# Patient Record
Sex: MALE | Race: BLACK OR AFRICAN AMERICAN | Employment: OTHER | URBAN - METROPOLITAN AREA
[De-identification: names, ages, dates, MRNs, and addresses within clinical notes are randomized per-mention and may not be internally consistent; named-entity substitution may affect disease eponyms.]

---

## 2017-02-14 ENCOUNTER — HOSPITAL ENCOUNTER (OUTPATIENT)
Dept: RADIATION THERAPY | Age: 82
Discharge: HOME | End: 2017-02-14
Payer: MEDICARE

## 2017-02-14 ENCOUNTER — HOSPITAL ENCOUNTER (OUTPATIENT)
Dept: NUCLEAR MEDICINE | Age: 82
Discharge: HOME | End: 2017-02-14
Attending: UROLOGY
Payer: MEDICARE

## 2017-02-14 ENCOUNTER — HOSPITAL ENCOUNTER (OUTPATIENT)
Dept: CT IMAGING | Age: 82
Discharge: HOME | End: 2017-02-14
Attending: UROLOGY
Payer: MEDICARE

## 2017-02-14 DIAGNOSIS — C61 MALIGNANT NEOPLASM OF PROSTATE (HCC): ICD-10-CM

## 2017-02-14 LAB — CREAT BLD-MCNC: 0.9 MG/DL (ref 0.6–1.3)

## 2017-02-14 PROCEDURE — 82565 ASSAY OF CREATININE: CPT

## 2017-02-14 PROCEDURE — 71260 CT THORAX DX C+: CPT

## 2017-02-14 PROCEDURE — 74177 CT ABD & PELVIS W/CONTRAST: CPT

## 2017-02-14 PROCEDURE — 74011636320 HC RX REV CODE- 636/320: Performed by: UROLOGY

## 2017-02-14 PROCEDURE — 74011000258 HC RX REV CODE- 258: Performed by: UROLOGY

## 2017-02-14 PROCEDURE — 78306 BONE IMAGING WHOLE BODY: CPT

## 2017-02-14 RX ORDER — SODIUM CHLORIDE 0.9 % (FLUSH) 0.9 %
10 SYRINGE (ML) INJECTION
Status: COMPLETED | OUTPATIENT
Start: 2017-02-14 | End: 2017-02-14

## 2017-02-14 RX ADMIN — IOPAMIDOL 100 ML: 755 INJECTION, SOLUTION INTRAVENOUS at 14:50

## 2017-02-14 RX ADMIN — SODIUM CHLORIDE 100 ML: 900 INJECTION, SOLUTION INTRAVENOUS at 14:50

## 2017-02-14 RX ADMIN — IOHEXOL 50 ML: 240 INJECTION, SOLUTION INTRATHECAL; INTRAVASCULAR; INTRAVENOUS; ORAL at 11:00

## 2017-02-14 RX ADMIN — Medication 10 ML: at 14:50

## 2017-08-22 ENCOUNTER — HOSPITAL ENCOUNTER (OUTPATIENT)
Dept: CT IMAGING | Age: 82
Discharge: HOME | End: 2017-08-22
Attending: SPECIALIST
Payer: MEDICARE

## 2017-08-22 DIAGNOSIS — C07 MALIGNANT NEOPLASM OF PAROTID GLAND (HCC): ICD-10-CM

## 2017-08-22 DIAGNOSIS — H90.3 SENSORY HEARING LOSS, BILATERAL: ICD-10-CM

## 2017-08-22 LAB — CREAT BLD-MCNC: 0.9 MG/DL (ref 0.6–1.3)

## 2017-08-22 PROCEDURE — 74011000258 HC RX REV CODE- 258: Performed by: SPECIALIST

## 2017-08-22 PROCEDURE — 70486 CT MAXILLOFACIAL W/O DYE: CPT

## 2017-08-22 PROCEDURE — 82565 ASSAY OF CREATININE: CPT

## 2017-08-22 PROCEDURE — 74011636320 HC RX REV CODE- 636/320: Performed by: SPECIALIST

## 2017-08-22 PROCEDURE — 70491 CT SOFT TISSUE NECK W/DYE: CPT

## 2017-08-22 RX ORDER — SODIUM CHLORIDE 0.9 % (FLUSH) 0.9 %
10 SYRINGE (ML) INJECTION
Status: COMPLETED | OUTPATIENT
Start: 2017-08-22 | End: 2017-08-22

## 2017-08-22 RX ADMIN — SODIUM CHLORIDE 100 ML: 900 INJECTION, SOLUTION INTRAVENOUS at 14:24

## 2017-08-22 RX ADMIN — Medication 10 ML: at 14:24

## 2017-08-22 RX ADMIN — IOPAMIDOL 100 ML: 612 INJECTION, SOLUTION INTRAVENOUS at 14:24

## 2017-09-01 ENCOUNTER — HOSPITAL ENCOUNTER (OUTPATIENT)
Dept: RADIATION THERAPY | Age: 82
Discharge: HOME | End: 2017-09-01

## 2017-09-12 ENCOUNTER — HOSPITAL ENCOUNTER (EMERGENCY)
Age: 82
Discharge: STILL ADMITTED OR ADMINISTRATIVE | End: 2017-09-12
Attending: EMERGENCY MEDICINE
Payer: MEDICARE

## 2017-09-12 ENCOUNTER — HOSPITAL ENCOUNTER (OUTPATIENT)
Dept: PET IMAGING | Age: 82
Discharge: HOME | End: 2017-09-12
Attending: RADIOLOGY
Payer: MEDICARE

## 2017-09-12 VITALS — WEIGHT: 150 LBS | HEIGHT: 65 IN | BODY MASS INDEX: 24.99 KG/M2

## 2017-09-12 DIAGNOSIS — C07 MALIGNANT NEOPLASM OF PAROTID GLAND (HCC): ICD-10-CM

## 2017-09-12 DIAGNOSIS — C08.9 MALIGNANT NEOPLASM OF SALIVARY GLAND (HCC): ICD-10-CM

## 2017-09-12 PROCEDURE — 75810000275 HC EMERGENCY DEPT VISIT NO LEVEL OF CARE

## 2017-09-12 PROCEDURE — A9552 F18 FDG: HCPCS

## 2017-09-12 RX ORDER — SODIUM CHLORIDE 0.9 % (FLUSH) 0.9 %
10 SYRINGE (ML) INJECTION
Status: COMPLETED | OUTPATIENT
Start: 2017-09-12 | End: 2017-09-12

## 2017-09-12 RX ADMIN — Medication 10 ML: at 12:00

## 2018-02-06 ENCOUNTER — HOSPITAL ENCOUNTER (INPATIENT)
Age: 83
LOS: 3 days | Discharge: HOME | DRG: 312 | End: 2018-02-09
Attending: EMERGENCY MEDICINE | Admitting: FAMILY MEDICINE
Payer: MEDICARE

## 2018-02-06 ENCOUNTER — APPOINTMENT (OUTPATIENT)
Dept: GENERAL RADIOLOGY | Age: 83
DRG: 312 | End: 2018-02-06
Attending: EMERGENCY MEDICINE
Payer: MEDICARE

## 2018-02-06 ENCOUNTER — APPOINTMENT (OUTPATIENT)
Dept: CT IMAGING | Age: 83
DRG: 312 | End: 2018-02-06
Attending: FAMILY MEDICINE
Payer: MEDICARE

## 2018-02-06 ENCOUNTER — APPOINTMENT (OUTPATIENT)
Dept: CT IMAGING | Age: 83
DRG: 312 | End: 2018-02-06
Attending: EMERGENCY MEDICINE
Payer: MEDICARE

## 2018-02-06 DIAGNOSIS — R63.6 LOW WEIGHT: ICD-10-CM

## 2018-02-06 DIAGNOSIS — R55 SYNCOPE AND COLLAPSE: Primary | ICD-10-CM

## 2018-02-06 LAB
ALBUMIN SERPL-MCNC: 3.1 G/DL (ref 3.5–5)
ALBUMIN/GLOB SERPL: 0.7 {RATIO} (ref 1.1–2.2)
ALP SERPL-CCNC: 435 U/L (ref 45–117)
ALT SERPL-CCNC: 14 U/L (ref 12–78)
ANION GAP SERPL CALC-SCNC: 11 MMOL/L (ref 5–15)
APPEARANCE UR: CLEAR
AST SERPL-CCNC: 38 U/L (ref 15–37)
ATRIAL RATE: 62 BPM
BACTERIA URNS QL MICRO: NEGATIVE /HPF
BASOPHILS # BLD: 0.1 K/UL (ref 0–0.1)
BASOPHILS NFR BLD: 2 % (ref 0–1)
BILIRUB SERPL-MCNC: 0.4 MG/DL (ref 0.2–1)
BILIRUB UR QL: NEGATIVE
BUN SERPL-MCNC: 24 MG/DL (ref 6–20)
BUN/CREAT SERPL: 18 (ref 12–20)
CALCIUM SERPL-MCNC: 9.4 MG/DL (ref 8.5–10.1)
CALCULATED P AXIS, ECG09: 34 DEGREES
CALCULATED R AXIS, ECG10: 37 DEGREES
CALCULATED T AXIS, ECG11: 39 DEGREES
CHLORIDE SERPL-SCNC: 103 MMOL/L (ref 97–108)
CK SERPL-CCNC: 89 U/L (ref 39–308)
CO2 SERPL-SCNC: 24 MMOL/L (ref 21–32)
COLOR UR: ABNORMAL
CREAT SERPL-MCNC: 1.3 MG/DL (ref 0.7–1.3)
DIAGNOSIS, 93000: NORMAL
DIFFERENTIAL METHOD BLD: ABNORMAL
EOSINOPHIL # BLD: 0 K/UL (ref 0–0.4)
EOSINOPHIL NFR BLD: 1 % (ref 0–7)
EPITH CASTS URNS QL MICRO: ABNORMAL /LPF
ERYTHROCYTE [DISTWIDTH] IN BLOOD BY AUTOMATED COUNT: 17.8 % (ref 11.5–14.5)
FLUAV AG NPH QL IA: NEGATIVE
FLUBV AG NOSE QL IA: NEGATIVE
FOLATE SERPL-MCNC: 26.4 NG/ML (ref 5–21)
GLOBULIN SER CALC-MCNC: 4.3 G/DL (ref 2–4)
GLUCOSE SERPL-MCNC: 121 MG/DL (ref 65–100)
GLUCOSE UR STRIP.AUTO-MCNC: NEGATIVE MG/DL
HCT VFR BLD AUTO: 29 % (ref 36.6–50.3)
HGB BLD-MCNC: 9.4 G/DL (ref 12.1–17)
HGB UR QL STRIP: NEGATIVE
HYALINE CASTS URNS QL MICRO: ABNORMAL /LPF (ref 0–5)
IMM GRANULOCYTES # BLD: 0 K/UL
IMM GRANULOCYTES NFR BLD AUTO: 0 %
KETONES UR QL STRIP.AUTO: NEGATIVE MG/DL
LEUKOCYTE ESTERASE UR QL STRIP.AUTO: NEGATIVE
LYMPHOCYTES # BLD: 1 K/UL (ref 0.8–3.5)
LYMPHOCYTES NFR BLD: 36 % (ref 12–49)
MCH RBC QN AUTO: 29.7 PG (ref 26–34)
MCHC RBC AUTO-ENTMCNC: 32.4 G/DL (ref 30–36.5)
MCV RBC AUTO: 91.8 FL (ref 80–99)
METAMYELOCYTES NFR BLD MANUAL: 1 %
MONOCYTES # BLD: 0.3 K/UL (ref 0–1)
MONOCYTES NFR BLD: 9 % (ref 5–13)
MYELOCYTES NFR BLD MANUAL: 2 %
NEUTS BAND NFR BLD MANUAL: 1 % (ref 0–6)
NEUTS SEG # BLD: 1.4 K/UL (ref 1.8–8)
NEUTS SEG NFR BLD: 48 % (ref 32–75)
NITRITE UR QL STRIP.AUTO: NEGATIVE
NRBC # BLD: 0.12 K/UL (ref 0–0.01)
NRBC BLD-RTO: 4.3 PER 100 WBC
P-R INTERVAL, ECG05: 114 MS
PH UR STRIP: 5 [PH] (ref 5–8)
PLATELET # BLD AUTO: 100 K/UL (ref 150–400)
PMV BLD AUTO: 9.8 FL (ref 8.9–12.9)
POTASSIUM SERPL-SCNC: 4.2 MMOL/L (ref 3.5–5.1)
PROT SERPL-MCNC: 7.4 G/DL (ref 6.4–8.2)
PROT UR STRIP-MCNC: 30 MG/DL
Q-T INTERVAL, ECG07: 448 MS
QRS DURATION, ECG06: 80 MS
QTC CALCULATION (BEZET), ECG08: 454 MS
RBC # BLD AUTO: 3.16 M/UL (ref 4.1–5.7)
RBC #/AREA URNS HPF: ABNORMAL /HPF (ref 0–5)
RBC MORPH BLD: ABNORMAL
SODIUM SERPL-SCNC: 138 MMOL/L (ref 136–145)
SP GR UR REFRACTOMETRY: 1.02 (ref 1–1.03)
TROPONIN I SERPL-MCNC: <0.04 NG/ML
TROPONIN I SERPL-MCNC: <0.04 NG/ML
TSH SERPL DL<=0.05 MIU/L-ACNC: 1.23 UIU/ML (ref 0.36–3.74)
UR CULT HOLD, URHOLD: NORMAL
UROBILINOGEN UR QL STRIP.AUTO: 1 EU/DL (ref 0.2–1)
VENTRICULAR RATE, ECG03: 62 BPM
VIT B12 SERPL-MCNC: 643 PG/ML (ref 211–911)
WBC # BLD AUTO: 2.8 K/UL (ref 4.1–11.1)
WBC URNS QL MICRO: ABNORMAL /HPF (ref 0–4)

## 2018-02-06 PROCEDURE — 99285 EMERGENCY DEPT VISIT HI MDM: CPT

## 2018-02-06 PROCEDURE — 93306 TTE W/DOPPLER COMPLETE: CPT | Performed by: FAMILY MEDICINE

## 2018-02-06 PROCEDURE — 36415 COLL VENOUS BLD VENIPUNCTURE: CPT | Performed by: EMERGENCY MEDICINE

## 2018-02-06 PROCEDURE — 74011000258 HC RX REV CODE- 258: Performed by: EMERGENCY MEDICINE

## 2018-02-06 PROCEDURE — 74011250636 HC RX REV CODE- 250/636: Performed by: FAMILY MEDICINE

## 2018-02-06 PROCEDURE — 81001 URINALYSIS AUTO W/SCOPE: CPT | Performed by: EMERGENCY MEDICINE

## 2018-02-06 PROCEDURE — 80053 COMPREHEN METABOLIC PANEL: CPT | Performed by: EMERGENCY MEDICINE

## 2018-02-06 PROCEDURE — 93005 ELECTROCARDIOGRAM TRACING: CPT

## 2018-02-06 PROCEDURE — 82607 VITAMIN B-12: CPT | Performed by: EMERGENCY MEDICINE

## 2018-02-06 PROCEDURE — 70496 CT ANGIOGRAPHY HEAD: CPT

## 2018-02-06 PROCEDURE — 84443 ASSAY THYROID STIM HORMONE: CPT | Performed by: EMERGENCY MEDICINE

## 2018-02-06 PROCEDURE — 74011250637 HC RX REV CODE- 250/637: Performed by: FAMILY MEDICINE

## 2018-02-06 PROCEDURE — 82746 ASSAY OF FOLIC ACID SERUM: CPT | Performed by: EMERGENCY MEDICINE

## 2018-02-06 PROCEDURE — 70450 CT HEAD/BRAIN W/O DYE: CPT

## 2018-02-06 PROCEDURE — 74011636320 HC RX REV CODE- 636/320: Performed by: EMERGENCY MEDICINE

## 2018-02-06 PROCEDURE — 71046 X-RAY EXAM CHEST 2 VIEWS: CPT

## 2018-02-06 PROCEDURE — 65660000000 HC RM CCU STEPDOWN

## 2018-02-06 PROCEDURE — 84484 ASSAY OF TROPONIN QUANT: CPT | Performed by: EMERGENCY MEDICINE

## 2018-02-06 PROCEDURE — 87804 INFLUENZA ASSAY W/OPTIC: CPT | Performed by: FAMILY MEDICINE

## 2018-02-06 PROCEDURE — 82550 ASSAY OF CK (CPK): CPT | Performed by: EMERGENCY MEDICINE

## 2018-02-06 PROCEDURE — 85025 COMPLETE CBC W/AUTO DIFF WBC: CPT | Performed by: EMERGENCY MEDICINE

## 2018-02-06 RX ORDER — SODIUM CHLORIDE 0.9 % (FLUSH) 0.9 %
5-10 SYRINGE (ML) INJECTION AS NEEDED
Status: DISCONTINUED | OUTPATIENT
Start: 2018-02-06 | End: 2018-02-09 | Stop reason: HOSPADM

## 2018-02-06 RX ORDER — ATENOLOL 25 MG/1
50 TABLET ORAL
Status: DISCONTINUED | OUTPATIENT
Start: 2018-02-07 | End: 2018-02-09 | Stop reason: HOSPADM

## 2018-02-06 RX ORDER — LOVASTATIN 20 MG/1
40 TABLET ORAL
Status: DISCONTINUED | OUTPATIENT
Start: 2018-02-06 | End: 2018-02-09 | Stop reason: HOSPADM

## 2018-02-06 RX ORDER — LOVASTATIN 20 MG/1
40 TABLET ORAL
Status: DISCONTINUED | OUTPATIENT
Start: 2018-02-06 | End: 2018-02-06

## 2018-02-06 RX ORDER — SODIUM CHLORIDE 9 MG/ML
75 INJECTION, SOLUTION INTRAVENOUS CONTINUOUS
Status: DISPENSED | OUTPATIENT
Start: 2018-02-06 | End: 2018-02-07

## 2018-02-06 RX ORDER — ASPIRIN 81 MG/1
81 TABLET ORAL DAILY
Status: DISCONTINUED | OUTPATIENT
Start: 2018-02-07 | End: 2018-02-07 | Stop reason: SDUPTHER

## 2018-02-06 RX ORDER — GUAIFENESIN 100 MG/5ML
81 LIQUID (ML) ORAL DAILY
Status: DISCONTINUED | OUTPATIENT
Start: 2018-02-07 | End: 2018-02-09 | Stop reason: HOSPADM

## 2018-02-06 RX ORDER — FAMOTIDINE 20 MG/1
20 TABLET, FILM COATED ORAL EVERY 12 HOURS
Status: DISCONTINUED | OUTPATIENT
Start: 2018-02-06 | End: 2018-02-07

## 2018-02-06 RX ORDER — ACETAMINOPHEN 325 MG/1
650 TABLET ORAL
Status: DISCONTINUED | OUTPATIENT
Start: 2018-02-06 | End: 2018-02-09 | Stop reason: HOSPADM

## 2018-02-06 RX ORDER — SODIUM CHLORIDE 0.9 % (FLUSH) 0.9 %
10 SYRINGE (ML) INJECTION
Status: COMPLETED | OUTPATIENT
Start: 2018-02-06 | End: 2018-02-06

## 2018-02-06 RX ORDER — SODIUM CHLORIDE 0.9 % (FLUSH) 0.9 %
5-10 SYRINGE (ML) INJECTION EVERY 8 HOURS
Status: DISCONTINUED | OUTPATIENT
Start: 2018-02-06 | End: 2018-02-09 | Stop reason: HOSPADM

## 2018-02-06 RX ORDER — ACETAMINOPHEN 650 MG/1
650 SUPPOSITORY RECTAL
Status: DISCONTINUED | OUTPATIENT
Start: 2018-02-06 | End: 2018-02-09 | Stop reason: HOSPADM

## 2018-02-06 RX ADMIN — SODIUM CHLORIDE 75 ML/HR: 900 INJECTION, SOLUTION INTRAVENOUS at 19:19

## 2018-02-06 RX ADMIN — Medication 10 ML: at 18:00

## 2018-02-06 RX ADMIN — FAMOTIDINE 20 MG: 20 TABLET, FILM COATED ORAL at 21:41

## 2018-02-06 RX ADMIN — SODIUM CHLORIDE 100 ML: 900 INJECTION, SOLUTION INTRAVENOUS at 16:12

## 2018-02-06 RX ADMIN — Medication 10 ML: at 16:12

## 2018-02-06 RX ADMIN — IOPAMIDOL 100 ML: 755 INJECTION, SOLUTION INTRAVENOUS at 16:12

## 2018-02-06 RX ADMIN — LOVASTATIN 40 MG: 20 TABLET ORAL at 21:41

## 2018-02-06 RX ADMIN — Medication 10 ML: at 21:44

## 2018-02-06 NOTE — PROGRESS NOTES
Admission Medication Reconciliation:    Information obtained from: patient, patient's children    Significant PMH/Disease States:   Past Medical History:   Diagnosis Date    Arthritis     Cancer ([facility] [address] 2004    malignant neoplasm of salivary glands    GERD (gastroesophageal reflux disease)     Hearing loss     bilateral    Heart failure ([facility] [address]     HAS PACER    Hypercholesteremia     Hypertension     medicated    Other ill-defined conditions(799.89)     facial nerve disorder    Other ill-defined conditions(799.89)     enlarged prostate    Other ill-defined conditions(799.89)     tumor on parotid gland    Stroke [facility]) 2005       Chief Complaint for this Admission:    Chief Complaint   Patient presents with    Syncope        Allergies:  Review of patient's allergies indicates no known allergies. Prior to Admission Medications:   Prior to Admission Medications   Prescriptions Last Dose Informant Patient Reported? Taking? HYDROcodone-acetaminophen (NORCO) 5-325 mg per tablet   No Yes   Sig: Take 1 Tab by mouth every four (4) hours as needed. Max Daily Amount: 6 Tabs. aspirin delayed-release 81 mg tablet   No Yes   Sig: Take 1 Tab by mouth daily. atenolol (TENORMIN) 50 mg tablet   Yes Yes   Sig: Take 50 mg by mouth every morning. Facility-Administered Medications: None         Comments/Recommendations: All medications/allergies have been reviewed and updated. Obtained medication information from the patient and his children. The patient stated that he stopped taking Asya (dutasteride/tamsulosin) and lovastatin. He stated \"I only take a blood pressure medication and a baby aspirin. \" He has Norco PRN, but per his daughter he has only had to take it about 3 times total.     Changes made to Prior to Admission (PTA) Medication List:     Medications Added:  - None     Medications Changed:  - None     Medications Removed:  - York Late  - lovastatin       Thank you for allowing me to participate in the care of this patient. Please contact the medication reconciliation pharmacist () with any questions. Mark Hanks, PharmD Candidate Class of 2018      Per Dr. Maria Fernanda Anna, start lovastatin. FRDEA rodriguez.

## 2018-02-06 NOTE — ED PROVIDER NOTES
HPI Comments: 80 y.o. male with past medical history significant for HTN, hypercholesteremia, parotid gland tumor, stroke, arthritis, GERD, and pacemaker placement who presents from home via EMS with chief complaint of syncope. Pt states he sustained a syncopal episode during breakfast this morning. Pt is unsure how long he was unconsciousness and he is unable to recall details about the event. Pt states this is not new and he has had 6-8 syncopal episodes in the past.  Pt states he has been evaluated in the ED but a cause was never found. Pt notes he had some nausea this morning but started vomiting after the episode. Pt reportedly had a stroke 12 years ago and a history of parotid gland CA. 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 Pt states for the last two weeks he has had increased L-facial weakness with some recent diarrhea and chills. Pt denies having urinary incontinence, CP, or SOB. There are no other acute medical concerns at this time. Old Chart Review: Pt has a history of parotid gland cancer which was diagnosed in 2004. He underwent surgery for the parotid gland cancer in 05/2011 and he had a recurrence of the parotid gland CA two years ago. Pt underwent resection of the tumor on 11/01/2016 which was done by Dr. Lubna Cooley. PCP: Judith Dietz MD    Neurosurgeon: Dr. Lubna Cooley    ENT: Dr. Kiko Fields    Note written by Milena Chinchilla. Rainelle Gaucher, as dictated by Daniel Coleman MD 10:33 AM    The history is provided by the patient and a relative.         Past Medical History:   Diagnosis Date    Arthritis     Cancer [facility]) 2004    malignant neoplasm of salivary glands    GERD (gastroesophageal reflux disease)     Hearing loss     bilateral    Heart failure (H[facility])     HAS PACER    Hypercholesteremia     Hypertension     medicated    Other ill-defined conditions(799.89)     facial nerve disorder    Other ill-defined conditions(799.89) enlarged prostate    Other ill-defined conditions(799.89)     tumor on parotid gland    Stroke [facility]) 2005       Past Surgical History:   Procedure Laterality Date    HX APPENDECTOMY      HX HEENT  5/2011    parotid gland surgery    HX ORTHOPAEDIC  1980's    Rotator cuff repair-RIGHT    HX OTHER SURGICAL      keloid removed left shoulder    HX PACEMAKER  2016    HX UROLOGICAL      prostate surgery         Family History:   Problem Relation Age of Onset    Cancer Mother      UNKNOWN LOCATION    Anesth Problems Neg Hx        Social History     Social History    Marital status:      Spouse name: N/A    Number of children: N/A    Years of education: N/A     Occupational History    Not on file. Social History Main Topics    Smoking status: Former Smoker     Packs/day: 1.00     Years: 20.00     Quit date: 5/18/1980    Smokeless tobacco: Never Used    Alcohol use No    Drug use: No    Sexual activity: Not on file     Other Topics Concern    Not on file     Social History Narrative         ALLERGIES: Review of patient's allergies indicates no known allergies. Review of Systems   Constitutional: Positive for chills. Negative for fever. HENT: Negative for rhinorrhea and sore throat. Respiratory: Negative for cough and shortness of breath. Cardiovascular: Negative for chest pain. Gastrointestinal: Positive for diarrhea, nausea and vomiting. Negative for abdominal pain. Genitourinary: Negative for dysuria and hematuria. Negative for urinary incontinence   Musculoskeletal: Negative for arthralgias and myalgias. Skin: Negative for pallor and rash. Neurological: Positive for syncope and facial asymmetry (not new). Negative for dizziness, weakness and light-headedness. All other systems reviewed and are negative. Vitals:    02/06/18 1029   BP: 122/62   Pulse: 62   Resp: 16   Temp: 97.5 °F (36.4 °C)   SpO2: 100%            Physical Exam   Vital signs reviewed.   Nursing notes reviewed. Const:  No acute distress, well developed, well nourished  Head:  Atraumatic, normocephalic  Eyes:  PERRL, conjunctiva normal, no scleral icterus  Neck:  Supple, trachea midline  Cardiovascular:  RRR, no murmurs, no gallops, no rubs  Resp:  No resp distress, no increased work of breathing, no wheezes, no rhonchi, no rales,  Abd:  Soft, non-tender, non-distended, no rebound, no guarding, no CVA tenderness  :  Deferred  MSK:  No pedal edema, normal ROM  Neuro:  Alert and oriented x3; L-sided facial droop  Skin:  Warm, dry, intact  Psych: normal mood and affect, behavior is normal, judgement and thought content is normal    Note written by Jacqui Friday. Gary Ruiz, as dictated by Imer Spear MD 10:33 AM    MDM  Number of Diagnoses or Management Options  Syncope and collapse:      Amount and/or Complexity of Data Reviewed  Clinical lab tests: ordered and reviewed  Tests in the radiology section of CPT®: ordered and reviewed  Review and summarize past medical records: yes    Patient Progress  Patient progress: stable    Pt. Presents to the ER with complaints of syncope. Pt. Has a history of the same. Concern for syncope v. Seizure. Pt. Is awake and alert and at his baseline in the ER. Pt. To be evaluated for admission by the hospitalist and to be seen by ENT. ED Course       Procedures    CONSULT NOTE:  1:55 PM Imer Spear MD spoke with Dr. Farnaz Wood, Consult for Hospitalist.  Discussed available diagnostic tests and clinical findings. He is in agreement with care plans as outlined. He would like for Dr. Alexsandra Matthew to be consulted. He will see and admit. CONSULT NOTE:  2:36 PM Imer Spear MD spoke with Dr. Mariana Madison, Consult for ENT. Discussed available diagnostic tests and clinical findings. He will be involved with his care. He cannot get anymore radiation or more surgery.

## 2018-02-06 NOTE — ROUTINE PROCESS
TRANSFER - OUT REPORT:    Verbal report given to Royal Langford RN (name) on Phoebe Peterson  being transferred to [facility](unit) for routine progression of care       Report consisted of patients Situation, Background, Assessment and   Recommendations(SBAR). Information from the following report(s) SBAR and ED Summary was reviewed with the receiving nurse. Lines:   Peripheral IV 02/06/18 Left Antecubital (Active)   Site Assessment Clean, dry, & intact 2/6/2018  1:52 PM   Phlebitis Assessment 0 2/6/2018  1:52 PM   Infiltration Assessment 0 2/6/2018  1:52 PM   Dressing Status Clean, dry, & intact 2/6/2018  1:52 PM   Dressing Type Transparent 2/6/2018  1:52 PM   Hub Color/Line Status Pink;Capped;Flushed;Patent 2/6/2018  1:52 PM        Opportunity for questions and clarification was provided.

## 2018-02-06 NOTE — H&P
[address] [place] [address]   ACUTE CARE HISTORY AND PHYSICAL    Keturah HOLGUIN  MR#: [id number]  : 1935  ACCOUNT #: [de-identified]   DATE OF SERVICE: 2018    CHIEF COMPLAINT:  Syncope. HISTORY OF PRESENT ILLNESS:  The patient is an 66-year-old gentleman with past medical history of parotid tumor, status post radiation as well as three surgeries; hypertension, hypercholesterolemia, stroke, arthritis, GERD and pacemaker placement, who presents to the hospital with the above-mentioned symptoms. The patient reports that his symptoms happened this morning. He was eating breakfast when he had a sudden onset of syncope and passed out. The patient does not remember how long he was passed out, does not recall much details but does report that he did not have any prodromal symptoms; did not have any chest pain, shortness of breath, lightheadedness, dizziness, or any changes in his neurological status. The patient reports that he has a left-sided parotid tumor that was diagnosed \"many years back. \"  The patient reports that he received multiple doses of radiation and also underwent surgery three times, the last being around , but at that point of time, the full tumor could not be removed. 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 The patient reports that he has noticed that his left side is weaker than usual.  He has left lower extremity and upper extremity weakness that has been going on for many weeks. The patient also reports that he had some loose stools with one episode yesterday and also had some chills. The patient denies any other complaints or problems.   Denies any headache, blurry vision, trouble swallowing or trouble with speech; any chest pain, shortness of breath, cough, fever, chills, abdominal pain, constipation, diarrhea, urinary symptoms, focal or generalized neurological weakness, recent travel, sick contacts, falls, injuries or any other concerns or problems besides all those mentioned above in terms of neurological changes. The patient denies any other complaints or problems. PAST MEDICAL HISTORY:  See above. HOME MEDICATIONS:  Currently the patient is on aspirin 81 mg daily, Norco 5/325 mg q.4 hours, atenolol 50 mg daily. SOCIAL HISTORY:  Former smoker, used to smoke 1 pack per day for 20 years. No alcohol, no IV drug abuse. ALLERGIES:  NO KNOWN DRUG ALLERGIES. FAMILY HISTORY:  Mother has history of unknown cancer. REVIEW OF SYSTEMS:  All systems were reviewed and are found to be essentially negative except for the symptoms mentioned above. PHYSICAL EXAMINATION:  VITAL SIGNS:  Temperature 97.5, pulse 61, respiration rate 14, blood pressure 117/52, pulse ox 94% on room air. GENERAL:  Alert x3, awake, no acute distress, resting in bed, pleasant male, appears to be stated age. HEENT:  Pupils equal and reactive to light. Left facial droop. Moist mucous membranes. Nonicteric sclerae. NECK:  Supple. CHEST:  Clear to auscultation bilaterally. CORONARY:  S1, S2 heard. ABDOMEN:  Soft, nontender, nondistended. Bowel sounds are physiologic. EXTREMITIES:  No clubbing, no cyanosis, no edema. NEUROPSYCHIATRIC:  Pleasant mood and affect. Cranial nerves II-XII were grossly intact except for left facial droop. Left upper extremity shows 4/5 strength, left lower extremity 4/5 strength with minimal drift. Right upper extremity and right lower extremity 5/5 . SKIN:  Warm. LABORATORY DATA:  White count 2.0, hemoglobin 9.4, hematocrit 29, platelets . Urine shows no signs of infection. Sodium , potassium 4.2, chloride 103, bicarbonate 24, anion gap 11, glucose 121, BUN 24, creatinine , ALT 14, AST 30, alkaline phosphatase 435. CK 89, troponin less than 0.04.   CT of the head shows interval increase in the size of obstructive left parietal bone lesion and left skull base soft tissue lesion as described, which is 5.9 x 2.6 x 5.7 cm. X-ray of the chest shows no acute cardiopulmonary disease. EKG shows no acute ST elevation. ASSESSMENT AND PLAN:   1. Syncope with possible left-sided weakness. The patient has extension of what appears to be a parotid gland tumor into the parietal lobe which may be causing the syncope but does not explain left sided weakness  We will admit the patient on a telemetry bed. Neurosurgery as well as ENT consult has been requested. We will get a CTA of the head to better decipher patient's pathology. We will put patient on fall precautions. Continue aspirin, neurovascular checks, echocardiogram and close monitoring  hospital course  await neurosurgery and ENT input and continue to closely monitor. 2.  Syncope, mostly likely from #1. We will rule out other causes. We will get an echocardiogram.  We will provide  telemetry monitoring; TSH, B12 and folate levels and further intervention as per hospital course. Continue to monitor  as needed. 3.  Hypertension. Continue home medication. 4.  Hypercholesterolemia. Continue home medication. 5.  Mild elevation in liver enzymes. The etiology could be secondary to #1. We will repeat labs in the morning and continue to monitor. The patient denies any abdominal pain or any other symptoms associated with this . 6.   deep venous thrombosis prophylaxis. The patient will be on SCDs.       Marlin Libman, MD MM / TRENT  D: 02/06/2018 16:00     T: 02/06/2018 16:32  JOB #: [id number]

## 2018-02-06 NOTE — ED TRIAGE NOTES
Pt arrives with syncopal episode lasting \"longer than normal today and n/v/d x several days. Pt reports he has syncopal episodes frequently & this is normal for him. Hx of brain tumor. Pt also reports \"left side has been weaker for about a week\".

## 2018-02-06 NOTE — IP AVS SNAPSHOT
[address] 
[phone] Patient: Mark Rust MRN: [id number] MYM:0/00/3669 A check macy indicates which time of day the medication should be taken. My Medications START taking these medications Instructions Each Dose to Equal  
 Morning Noon Evening Bedtime  
 dronabinol 5 mg capsule Commonly known as:  Ton Wagner Your last dose was: Your next dose is: Take 1 Cap by mouth two (2) times a day. Max Daily Amount: 10 mg.  
 5 mg  
    
   
   
   
  
 levETIRAcetam 500 mg tablet Commonly known as:  KEPPRA Your last dose was: Your next dose is: Take 1 Tab by mouth two (2) times a day. 500 mg CONTINUE taking these medications Instructions Each Dose to Equal  
 Morning Noon Evening Bedtime  
 aspirin delayed-release 81 mg tablet Your last dose was: Your next dose is: Take 1 Tab by mouth daily. 81 mg  
    
   
   
   
  
 atenolol 50 mg tablet Commonly known as:  TENORMIN Your last dose was: Your next dose is: Take 50 mg by mouth every morning. 50 mg HYDROcodone-acetaminophen 5-325 mg per tablet Commonly known as:  Guadlupe Elms Your last dose was: Your next dose is: Take 1 Tab by mouth every four (4) hours as needed. Max Daily Amount: 6 Tabs. 1 Tab Where to Get Your Medications Information on where to get these meds will be given to you by the nurse or doctor. ! Ask your nurse or doctor about these medications  
  dronabinol 5 mg capsule  
 levETIRAcetam 500 mg tablet

## 2018-02-06 NOTE — IP AVS SNAPSHOT
[address] 
[phone] Patient: Garret Bruce MRN: [id number] VNS:9/80/8676 About your hospitalization You were admitted on:  February 6, 2018 You last received care in the:  [facility] 2N MED SURG You were discharged on:  February 9, 2018 Why you were hospitalized Your primary diagnosis was:  Syncope Your diagnoses also included:  Low Weight Follow-up Information Follow up With Details Comments Contact Info Noemy Villarreal MD  note empiric keppra for now ( neg EEG) [address] [facility] [address] 
[phone] Discharge Orders None A check macy indicates which time of day the medication should be taken. My Medications START taking these medications Instructions Each Dose to Equal  
 Morning Noon Evening Bedtime  
 dronabinol 5 mg capsule Commonly known as:  Marielle Garcia Your last dose was: Your next dose is: Take 1 Cap by mouth two (2) times a day. Max Daily Amount: 10 mg.  
 5 mg  
    
   
   
   
  
 levETIRAcetam 500 mg tablet Commonly known as:  KEPPRA Your last dose was: Your next dose is: Take 1 Tab by mouth two (2) times a day. 500 mg CONTINUE taking these medications Instructions Each Dose to Equal  
 Morning Noon Evening Bedtime  
 aspirin delayed-release 81 mg tablet Your last dose was: Your next dose is: Take 1 Tab by mouth daily. 81 mg  
    
   
   
   
  
 atenolol 50 mg tablet Commonly known as:  TENORMIN Your last dose was: Your next dose is: Take 50 mg by mouth every morning. 50 mg HYDROcodone-acetaminophen 5-325 mg per tablet Commonly known as:  Judd Fraction Your last dose was: Your next dose is: Take 1 Tab by mouth every four (4) hours as needed. Max Daily Amount: 6 Tabs. 1 Tab Where to Get Your Medications Information on where to get these meds will be given to you by the nurse or doctor. ! Ask your nurse or doctor about these medications  
  dronabinol 5 mg capsule  
 levETIRAcetam 500 mg tablet Discharge Instructions Discharge Instructions PATIENT ID: Chester Pallas MRN: [id number] YOB: 1935 DATE OF ADMISSION: 2/6/2018 10:21 AM   
DATE OF DISCHARGE: 2/9/2018 PRIMARY CARE PROVIDER: Chacho Murphy MD  
 
ATTENDING PHYSICIAN: Nena Mata MD 
DISCHARGING PROVIDER: Oz Petty MD   
To contact this individual call [phone] and ask the  to page. If unavailable ask to be transferred the Adult Hospitalist Department. DISCHARGE DIAGNOSES syncope CONSULTATIONS: IP CONSULT TO HOSPITALIST 
IP CONSULT TO OTOLARYNGOLOGY 
IP CONSULT TO NEUROLOGY 
IP CONSULT TO NEUROSURGERY 
IP CONSULT TO CARDIOLOGY PROCEDURES/SURGERIES: * No surgery found * PENDING TEST RESULTS:  
At the time of discharge the following test results are still pending: EEG 
 
FOLLOW UP APPOINTMENTS:  
Follow-up Information Follow up With Details Comments Contact Info Chacho Murphy MD  note empiric keppra for now ( neg EEG) [address] [place] [address] 
[phone] ADDITIONAL CARE RECOMMENDATIONS: na 
 
DIET: Resume previous diet ACTIVITY: Activity as tolerated WOUND CARE: na 
 
EQUIPMENT needed: na 
 
 
  
 SNF/Inpatient Rehab/LTAC Independent/assisted living Hospice Other:  
 
  
Signed:  
Avelino Pederson MD 
2/9/2018 
7:50 AM 
 
  
  
  
[employer] Announcement We are excited to announce that we are making your provider's discharge notes available to you in [employer]. You will see these notes when they are completed and signed by the physician that discharged you from your recent hospital stay. If you have any questions or concerns about any information you see in [employer], please call the Health Information Department where you were seen or reach out to your Primary Care Provider for more information about your plan of care. Introducing [facility]! Jacob Drake introduces [employer] patient portal. Now you can access parts of your medical record, email your doctor's office, and request medication refills online. 1. In your internet browser, go to [email or web address] 2. Click on the First Time User? Click Here link in the Sign In box. You will see the New Member Sign Up page. 3. Enter your [employer] Access Code exactly as it appears below. You will not need to use this code after youve completed the sign-up process. If you do not sign up before the expiration date, you must request a new code. · [employer] Access Code: [email or web address] Expires: 5/10/2018  8:13 AM 
 
4. Enter the last four digits of your Social Security Number (xxxx) and Date of Birth (mm/dd/yyyy) as indicated and click Submit. You will be taken to the next sign-up page. 5. Create a [employer] ID. This will be your [employer] login ID and cannot be changed, so think of one that is secure and easy to remember. 6. Create a [employer] password. You can change your password at any time. 7. Enter your Password Reset Question and Answer. This can be used at a later time if you forget your password. 8. Enter your e-mail address. You will receive e-mail notification when new information is available in [address] 9. Click Sign Up. You can now view and download portions of your medical record. 10. Click the Download Summary menu link to download a portable copy of your medical information. If you have questions, please visit the Frequently Asked Questions section of the [employer] website. Remember, [employer] is NOT to be used for urgent needs. For medical emergencies, dial 911. Now available from your iPhone and Android! Unresulted Labs-Please follow up with your PCP about these lab tests Order Current Status DUPLEX CAROTID BILATERAL Preliminary result Providers Seen During Your Hospitalization Provider Specialty Primary office phone Sandra Clancy MD Emergency Medicine [phone] Alicia Randhawa MD Hospitalist [phone] Your Primary Care Physician (PCP) Primary Care Physician Office Phone Office Fax Lurdes Mccann [address] You are allergic to the following No active allergies Recent Documentation Height Weight BMI Smoking Status 1.65 m 61.7 kg 22.66 kg/m2 Former Smoker Emergency Contacts Name Discharge Info Relation Home Work Mobile Venkat Zelaya DISCHARGE CAREGIVER [3] Child [2]   [phone] [address]  Son [22] [phone] Patient Belongings  The following personal items are in your possession at time of discharge: 
  Dental Appliances: None  Visual Aid: Glasses      Home Medications: None Ming: With patient  Clothing: With patient    Other Valuables: None Please provide this summary of care documentation to your next provider. Signatures-by signing, you are acknowledging that this After Visit Summary has been reviewed with you and you have received a copy. Patient Signature:  ____________________________________________________________ Date:  ____________________________________________________________  
  
Christophe November Provider Signature:  ____________________________________________________________ Date:  ____________________________________________________________

## 2018-02-07 LAB
BASOPHILS # BLD: 0 K/UL (ref 0–0.1)
BASOPHILS NFR BLD: 0 % (ref 0–1)
CHOLEST SERPL-MCNC: 114 MG/DL
DIFFERENTIAL METHOD BLD: ABNORMAL
EOSINOPHIL # BLD: 0 K/UL (ref 0–0.4)
EOSINOPHIL NFR BLD: 0 % (ref 0–7)
ERYTHROCYTE [DISTWIDTH] IN BLOOD BY AUTOMATED COUNT: 17.9 % (ref 11.5–14.5)
EST. AVERAGE GLUCOSE BLD GHB EST-MCNC: 123 MG/DL
HBA1C MFR BLD: 5.9 % (ref 4.2–6.3)
HCT VFR BLD AUTO: 22.7 % (ref 36.6–50.3)
HDLC SERPL-MCNC: 38 MG/DL
HDLC SERPL: 3 {RATIO} (ref 0–5)
HGB BLD-MCNC: 7.5 G/DL (ref 12.1–17)
IMM GRANULOCYTES # BLD: 0 K/UL
IMM GRANULOCYTES NFR BLD AUTO: 0 %
LDLC SERPL CALC-MCNC: 59 MG/DL (ref 0–100)
LIPID PROFILE,FLP: NORMAL
LYMPHOCYTES # BLD: 0.9 K/UL (ref 0.8–3.5)
LYMPHOCYTES NFR BLD: 40 % (ref 12–49)
MCH RBC QN AUTO: 30.1 PG (ref 26–34)
MCHC RBC AUTO-ENTMCNC: 33 G/DL (ref 30–36.5)
MCV RBC AUTO: 91.2 FL (ref 80–99)
MONOCYTES # BLD: 0.3 K/UL (ref 0–1)
MONOCYTES NFR BLD: 12 % (ref 5–13)
MYELOCYTES NFR BLD MANUAL: 4 %
NEUTS BAND NFR BLD MANUAL: 3 % (ref 0–6)
NEUTS SEG # BLD: 1 K/UL (ref 1.8–8)
NEUTS SEG NFR BLD: 40 % (ref 32–75)
NRBC # BLD: 0.13 K/UL (ref 0–0.01)
NRBC BLD-RTO: 5.7 PER 100 WBC
PLATELET # BLD AUTO: 77 K/UL (ref 150–400)
PMV BLD AUTO: 9.4 FL (ref 8.9–12.9)
PROMYELOCYTES NFR BLD MANUAL: 1 %
RBC # BLD AUTO: 2.49 M/UL (ref 4.1–5.7)
RBC MORPH BLD: ABNORMAL
TRIGL SERPL-MCNC: 85 MG/DL (ref ?–150)
VLDLC SERPL CALC-MCNC: 17 MG/DL
WBC # BLD AUTO: 2.3 K/UL (ref 4.1–11.1)

## 2018-02-07 PROCEDURE — 95816 EEG AWAKE AND DROWSY: CPT | Performed by: PSYCHIATRY & NEUROLOGY

## 2018-02-07 PROCEDURE — 85025 COMPLETE CBC W/AUTO DIFF WBC: CPT | Performed by: FAMILY MEDICINE

## 2018-02-07 PROCEDURE — 80061 LIPID PANEL: CPT | Performed by: FAMILY MEDICINE

## 2018-02-07 PROCEDURE — 36415 COLL VENOUS BLD VENIPUNCTURE: CPT | Performed by: FAMILY MEDICINE

## 2018-02-07 PROCEDURE — 93880 EXTRACRANIAL BILAT STUDY: CPT

## 2018-02-07 PROCEDURE — G8978 MOBILITY CURRENT STATUS: HCPCS

## 2018-02-07 PROCEDURE — G8979 MOBILITY GOAL STATUS: HCPCS

## 2018-02-07 PROCEDURE — 92610 EVALUATE SWALLOWING FUNCTION: CPT | Performed by: SPEECH-LANGUAGE PATHOLOGIST

## 2018-02-07 PROCEDURE — 74011250637 HC RX REV CODE- 250/637: Performed by: FAMILY MEDICINE

## 2018-02-07 PROCEDURE — 97116 GAIT TRAINING THERAPY: CPT

## 2018-02-07 PROCEDURE — 97161 PT EVAL LOW COMPLEX 20 MIN: CPT

## 2018-02-07 PROCEDURE — 83036 HEMOGLOBIN GLYCOSYLATED A1C: CPT | Performed by: FAMILY MEDICINE

## 2018-02-07 PROCEDURE — 65660000000 HC RM CCU STEPDOWN

## 2018-02-07 PROCEDURE — 93306 TTE W/DOPPLER COMPLETE: CPT

## 2018-02-07 RX ORDER — FAMOTIDINE 20 MG/1
20 TABLET, FILM COATED ORAL DAILY
Status: DISCONTINUED | OUTPATIENT
Start: 2018-02-07 | End: 2018-02-09 | Stop reason: HOSPADM

## 2018-02-07 RX ORDER — MEGESTROL ACETATE 40 MG/ML
200 SUSPENSION ORAL DAILY
Status: DISCONTINUED | OUTPATIENT
Start: 2018-02-07 | End: 2018-02-08

## 2018-02-07 RX ORDER — SODIUM CHLORIDE 0.9 % (FLUSH) 0.9 %
30 SYRINGE (ML) INJECTION
Status: COMPLETED | OUTPATIENT
Start: 2018-02-07 | End: 2018-02-07

## 2018-02-07 RX ADMIN — LOVASTATIN 40 MG: 20 TABLET ORAL at 21:30

## 2018-02-07 RX ADMIN — Medication 30 ML: at 21:30

## 2018-02-07 RX ADMIN — FAMOTIDINE 20 MG: 20 TABLET, FILM COATED ORAL at 09:14

## 2018-02-07 RX ADMIN — ATENOLOL 50 MG: 25 TABLET ORAL at 06:00

## 2018-02-07 RX ADMIN — Medication 10 ML: at 06:00

## 2018-02-07 RX ADMIN — Medication 10 ML: at 09:16

## 2018-02-07 RX ADMIN — ASPIRIN 81 MG 81 MG: 81 TABLET ORAL at 09:14

## 2018-02-07 NOTE — PROGRESS NOTES
Orders received, chart reviewed, patient received with physician and family discussing case. Patient also has floor cleaning staff waiting to re-enter room to finish the task. Will f/u for evaluation pending patient need for skilled acute OT services. Recommend with nursing patient to complete as able in order to maintain strength, endurance and independence: ADLs with supervision/setup, once completes Egress Test OOB to chair 3x/day and mobilizing to the bathroom for toileting with assist. Thank you for your assistance.      Yvette Miles M.S., OTR/L

## 2018-02-07 NOTE — CONSULTS
Otolaryngology (ENT) Consult    Subjective:     Date of Consultation:  February 7, 2018    Referring Physician: Obinna Mares MD    History of Present Illness:    42-year-old with past medical history of recurrent mucoepidermoid parotid tumor, status post radiation; He also has  hypertension, hypercholesterolemia, history of a stroke, arthritis, GERD and pacemaker placement, who presented  to the hospital syncopal spell yesterday. He  reports that his symptoms happened yesterday morning. He was eating breakfast when he had a sudden onset of syncope and passed out. Cause of syncopal spell is still unclear.      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    Malignant brain tumor ([facility] [address] 11/01/2016    Malignant neoplasm of parotid gland ([facility] [address] 05/26/2011     Past Medical History:   Diagnosis Date    Arthritis     Cancer [facility]) 2004    malignant neoplasm of salivary glands    GERD (gastroesophageal reflux disease)     Hearing loss     bilateral    Heart failure (HCC)     HAS PACER    Hypercholesteremia     Hypertension     medicated    Other ill-defined conditions(799.89)     facial nerve disorder    Other ill-defined conditions(799.89)     enlarged prostate    Other ill-defined conditions(799.89)     tumor on parotid gland    Stroke ([facility] [address] 2005      Family History   Problem Relation Age of Onset    Cancer Mother      UNKNOWN LOCATION    Anesth Problems Neg Hx       Social History   Substance Use Topics    Smoking status: Former Smoker     Packs/day: 1.00     Years: 20.00     Quit date: 5/18/1980    Smokeless tobacco: Never Used    Alcohol use No     Past Surgical History:   Procedure Laterality Date    HX APPENDECTOMY      HX HEENT  5/2011    parotid gland surgery    HX ORTHOPAEDIC  1980's    Rotator cuff repair-RIGHT    HX OTHER SURGICAL      keloid removed left shoulder    HX PACEMAKER  2016    HX UROLOGICAL      prostate surgery Current Facility-Administered Medications   Medication Dose Route Frequency    aspirin delayed-release tablet 81 mg  81 mg Oral DAILY    atenolol (TENORMIN) tablet 50 mg  50 mg Oral 7am    sodium chloride (NS) flush 5-10 mL  5-10 mL IntraVENous Q8H    sodium chloride (NS) flush 5-10 mL  5-10 mL IntraVENous PRN    acetaminophen (TYLENOL) tablet 650 mg  650 mg Oral Q4H PRN    Or    acetaminophen (TYLENOL) solution 650 mg  650 mg Per NG tube Q4H PRN    Or    acetaminophen (TYLENOL) suppository 650 mg  650 mg Rectal Q4H PRN    0.9% sodium chloride infusion  75 mL/hr IntraVENous CONTINUOUS    aspirin chewable tablet 81 mg  81 mg Oral DAILY    famotidine (PEPCID) tablet 20 mg  20 mg Oral Q12H    lovastatin (MEVACOR) tablet 40 mg  40 mg Oral QHS      No Known Allergies     Review of Systems:  A comprehensive review of systems was negative except for that written in the History of Present Illness. Objective:     Patient Vitals for the past 8 hrs:   BP Temp Pulse Resp SpO2 Weight   18 0627 102/54 98.4 °F (36.9 °C) 66 17 94 % 59.7 kg (131 lb 9.8 oz)     Temp (24hrs), Av.2 °F (36.8 °C), Min:97.5 °F (36.4 °C), Max:98.5 °F (36.9 °C)     1901 -  0700  In: 200 [P.O.:200]  Out: 200 [Urine:200]    Physical Exam:    Au clear  Nose clear  Left parotid region some enlargement but relatively stable  Facial Nerve 5/6 House-Brackmann left side, right normal   Left 6th nerve palsy      Assessment:     Left recurrent Mucoepidermoid parotid tumor with extension to skull base. Plan:     1. Syncopal spell cause unclear  2. Possible cardiac event  3. Left recurrent mucoepideromid tumor persistent   4.  Following       Signed By: Timmy Norris MD     2018

## 2018-02-07 NOTE — CONSULTS
NEUROLOGY CONSULT NOTE    Name Sudhir Barbosa Age 80 y.o. MRN [id number]  1935     Consulting Physician: Heydi Diez MD      Chief Complaint:  Syncope     Assessment:     · Principal Problem:  ·   Syncope (2018)  ·   ·   80year old AAM h/o mucoepidermoid parotid tumor s/p XRT with residual L facial weakness, L 6th CN palsy, HTN, HPL, TIA, PPM presenting with syncope 18. He reports recurrent syncopal events over the past 5 years typically associated with urinary incontinence but no post-event confusion/fatigue. No witnessed convulsions/seizure activity. Will obtain baseline EEG, although events are not entirely characteristic for epileptiform activity. Check CUS to ensure extracranial vessel patency. CTA head without significant intracranial stenosis and head CT without acute ischemia/hemorrhage. Recommendations:   CUS  Routine EEG  Consider PPM interrogation, TTE pending    Thank you very much for this referral. I appreciate the opportunity to participate in this patient's care. History of Present Illness: This is a 80 y.o.  right handed  male, we were asked to see for syncope. PMH notable for mucoepidermoid parotid tumor s/p XRT with residual L facial weakness, L 6th CN palsy, HTN, HPL, TIA, PPM.  He presents due to an episode of syncope yesterday. He states he was sitting down eating breakfast and noted sudden onset nausea. He went to the bathroom but did not vomit. He sat back down to finish his breakfast and experienced LOC for several minutes. He awoke with vomiting. No urinary incontinence or witnessed convulsions/seizure activity. He denied post event confusion or fatigue. No preceding dizziness/lightheadedness or palpitations. He denied headache, new focal weakness, numbness, speech or vision deficits.   He reports 7 other episodes of syncope over the past 5 years which present similarly but typically associated with urinary incontinence. He had PPM implanted and feels this helped reduce the frequency of his events. CT head this admission did not reveal acute ischemia or hemorrhage, noted L parotid tumor. CTA Head performed without occlusion or significant stenosis. He is back to his baseline today. No Known Allergies     Prior to Admission medications    Medication Sig Start Date End Date Taking? Authorizing Provider   aspirin delayed-release 81 mg tablet Take 1 Tab by mouth daily. 11/14/16  Yes Curt Antoine NP   HYDROcodone-acetaminophen [facility]) 5-325 mg per tablet Take 1 Tab by mouth every four (4) hours as needed. Max Daily Amount: 6 Tabs. 11/7/16  Yes Curt Antoine NP   atenolol (TENORMIN) 50 mg tablet Take 50 mg by mouth every morning.    Yes Historical Provider       Past Medical History:   Diagnosis Date    Arthritis     Cancer ([facility] [address] 2004    malignant neoplasm of salivary glands    GERD (gastroesophageal reflux disease)     Hearing loss     bilateral    Heart failure (HCC)     HAS PACER    Hypercholesteremia     Hypertension     medicated    Other ill-defined conditions(799.89)     facial nerve disorder    Other ill-defined conditions(799.89)     enlarged prostate    Other ill-defined conditions(799.89)     tumor on parotid gland    Stroke ([address] 2005        Past Surgical History:   Procedure Laterality Date    HX APPENDECTOMY      HX HEENT  5/2011    parotid gland surgery    HX ORTHOPAEDIC  1980's    Rotator cuff repair-RIGHT    HX OTHER SURGICAL      keloid removed left shoulder    HX PACEMAKER  2016    HX UROLOGICAL      prostate surgery        Social History   Substance Use Topics    Smoking status: Former Smoker     Packs/day: 1.00     Years: 20.00     Quit date: 5/18/1980    Smokeless tobacco: Never Used    Alcohol use No        Family History   Problem Relation Age of Onset    Cancer Mother      UNKNOWN LOCATION    Anesth Problems Neg Hx         Review of Systems:   Comprehensive review of systems performed and negative except for as listed above. Exam:     Visit Vitals    /46 (BP 1 Location: Right arm, BP Patient Position: Standing)    Pulse 79    Temp 98 °F (36.7 °C)    Resp 16    Ht 5' 4.96\" (1.65 m)    Wt 59.7 kg (131 lb 9.8 oz)    SpO2 97%    BMI 21.93 kg/m2        General: Well developed, well nourished. Patient in no apparent distress   Head: Normocephalic, atraumatic, anicteric sclera   Lungs:  Clear to auscultation bilaterally, No wheezes or rubs   Cardiac: Regular rate and rhythm with no murmurs. Abd: Bowel sounds were audible. No tenderness on palpation   Ext: No pedal edema   Skin: No overt signs of rash     Neurological Exam:  Mental Status: Alert and oriented to person place and time   Speech: Fluent no aphasia or dysarthria. Cranial Nerves:   Intact visual fields. Facial sensation is decreased slightly on the left. Facial movement is asymmetric-upper and lower facial weakness on the left. Palate is midline. Normal sternocleidomastoid strength. Tongue is midline. Hearing is intact bilaterally. Eyes: PERRL, EOM-L 6th palsy, dysconjugate gaze, no nystagmus, no ptosis. Motor:  LUE prox 3+/5 distal 5/5 (RTC injury), RUE 5/5, b/l LE 5-/5   Reflexes:   Deep tendon reflexes 1+/4 and symmetrical.  Plantar response is downgoing b/l. Sensory:   Decreased LT L face, LUE/LLE   Gait:  Gait is deferred. Tremor:   No tremor noted. Cerebellar:  Finger to nose and heel over shin to knee was demonstrated competently. Neurovascular: No carotid bruits. Imaging  CT Results (maximum last 3): Results from [place] encounter on 02/06/18   CTA HEAD   Narrative *PRELIMINARY REPORT*    There is no vascular occlusion or significant flow-limiting stenosis in the  head. Left calvarial and temporal bone lesions are noted. Preliminary report was provided by Dr. Enrike Langley, the on-call radiologist, on  2/6/2018 at 1730 hours. Final report to follow.     *END PRELIMINARY *    FINAL REPORT:    INDICATION:  SYNCOPE    EXAMINATION:  CT ANGIOGRAPHY HEAD    COMPARISON:  August 22, 2017    TECHNIQUE:  Following the uneventful administration of 100 mL Isovue 370  contrast material, axial CT angiography of the head was performed. 3D image  postprocessing was performed. CT dose reduction was achieved through use of a standardized protocol tailored  for this examination and automatic exposure control for dose modulation. Adaptive statistical iterative reconstruction (ASIR) was utilized. FINDINGS:    There has likely been increase in size of left skull destructive bone masses,  one in the squamosal portion of the left temporal bone (5.4 x 3.0 cm), and left  temporal bone/skull base mass approximately 7.0 x 4.8 cm. These are likely  contiguous on coronal imaging. Difficult to compare to the prior unenhanced PET  scan. There is some internal calcification vascularity in each of the lesions. The left skull base mass again encases the distal cervical and proximal petrous  left internal carotid artery segments which may be slightly attenuated though is  patent. The left vertebral artery is close proximity to the left skull base mass  though is not displaced or narrowed. The right vertebral artery is patent. The  basilar artery and posterior cerebral arteries are patent. The internal carotid  arteries in the cavernous and supraclinoid segments are widely patent. There is  some tortuosity and ectasia of the left cervical internal carotid artery  segment. The middle and anterior cerebral arteries are patent. There is no  flow-limiting stenosis, branch occlusion, or thrombosis. No evidence of  intracranial aneurysm. Impression IMPRESSION:    1. No flow-limiting stenosis or acute intracranial thrombosis/occlusion.   2. Destructive left skull base and temporal bone masses likely increased in the  interval. Slight attenuation of the distal cervical and proximal petrous left  internal carotid artery segments without occlusion or significant stenosis. .          CT HEAD WO CONT   Narrative INDICATION: left sided weakness     Exam: Noncontrast CT of the brain is performed with 5 mm collimation. CT dose reduction was achieved with the use of the standardized protocol  tailored for this examination and automatic exposure control for dose  modulation. Adaptive statistical iterative reconstruction (ASIR) was utilized. Direct comparison is made to prior CT dated November 2016. FINDINGS: Left occipital and temporal craniectomy postoperative changes are  noted. There is a extra-axial partially calcified soft tissue mass destroying  the more superior left parietal lobe, not visualized on prior CT dated November 2016; this mass measures approximately 5.9 cm AP by 2.6 cm transverse by 5.6 cm  craniocaudal. This mass is displacing the adjacent dura medially and there is  new mass effect on the left temporal lobe. There is also a left skull base soft  tissue mass which is difficult to define, but has increased in size, as compared  to prior CT dated November 2016; this mass is difficult to measure but likely  measures 3.4 cm x 4.5 cm and measured approximately 1.9 cm x 3.5 cm on prior CT  dated November 2016. The ventricular system is nondilated and there is no  herniation. There is no acute intracranial hemorrhage. No evidence of acute  territorial infarct. Impression IMPRESSION: Interval increase in size of destructive left parietal bone lesion  and left skull base soft tissue lesion, as described above. Repeat CT or MR with  contrast can be performed for confirmation further evaluation, as indicated. Results from [place] encounter on 08/22/17   CT SINUSES WO CONT   Narrative EXAM:  CT NECK SOFT TISSUE W CONT, CT SINUSES WO CONT    INDICATION:  Hematoma, neck;  Malignant neoplasm of parotid gland ([address] history  of mucosal epidermoid tumor    COMPARISON: Noncontrast postoperative head CT of 11/2/2016. CONTRAST: 100 mL of Isovue-300. TECHNIQUE: Multislice helical CT was performed from the skull base to the aortic  arch during uneventful rapid bolus intravenous contrast administration. Contiguous 2.5 mm axial images were reconstructed and lung and soft tissue  windows were generated. Thin section noncontrast images of the paranasal sinuses  were obtained. The study was performed as specifically ordered. Coronal and  sagittal reformations were generated. CT dose reduction was achieved through  use of a standardized protocol tailored for this examination and automatic  exposure control for dose modulation. FINDINGS:  The range of the postcontrast tissue neck images does not include the entirety  of the patient's current imaging abnormality, although imaging was performed as  requested and does include the area of the parotid gland (history indicated the  presence of a left parotid mass). The superior extent of the patient's recurrent  tumor is incompletely demonstrated, but this study is adequate for assessment of  the extensive skull base abnormality. The noncontrast sinus images do include  the area of interest superiorly but contrast resolution is suboptimal since  these images were obtained without IV contrast.    The patient has undergone prior left temporal craniectomy with resection of a  mass in the vicinity of the left parotid gland. When compared to the immediately  postoperative study of 11/2/2016, the presence of a recurrent soft tissue mass  at the surgical site is noted. This is incompletely demonstrated on the  postcontrast images but a soft tissue mass measuring at least 3.1 x 2.1 cm is  seen at the anterior margin of the resection cavity (3-1). There has been progression of bone destruction along the superior margin of the  craniectomy.  The noncontrast coronal reformatted images demonstrate an  extracranial soft tissue mass extending superiorly from the craniectomy site,  more extensive than on 11/2/2016, measuring at least 3.7 cm craniocaudad  (304-48) and 5.2 cm AP (3-74)    There is an enhancing soft tissue mass centered at the left petrous bone  resulting in bone destruction. The superior aspect of this mass is incompletely  visualized on the postcontrast images but the mass measures at least 3.6 cm AP,  4.0 cm transverse (3-8) and 2.6 cm craniocaudad. There is destruction of the  left petrous apex and skull base, with involvement of the margins of the jugular  foramen and foramen stenosis and extension to the posterior margin of the  foramen ovale. There is encasement of the petrous portion of the left internal  carotid artery, which is narrow but patent. There is a large soft tissue mass in the region of prior left mastoidectomy. .  This is approximately 3.7 x 3.5 cm (3-14) and extends to the left side of the  foramen magnum. This is more extensive than on the preoperative study of  9/27/2016. Within the cervical spine, there is extensive sclerotic change at C3-4 which is  unchanged and likely degenerative. Focal sclerotic osseous lesions in T1, T3,  and T4 progression since a chest CT of 2/14/2017. No lymph node enlargement is demonstrated in the neck. The lung apices are  clear. There is a large metallic foreign body in the anteromedial aspect of the  left orbit. Impression IMPRESSION:   Extensive recurrent/persistent left skull base and temporal region invasive mass  with progression since 11/2/2016. Destruction of the left lateral margin of the  foramen magnum, encasement of the distal left internal carotid artery, and  destruction in the region of the left jugular foramen. Progression of osseous metastatic disease in the visualized spine. MRI Results (maximum last 3):     Results from [place] encounter on 05/02/11   MRI ORB FACE NECK W AND W/O CONT   Narrative **Final Report**      ICD Codes / Adm.Diagnosis: 142.0   / MALIGNANT NEOPLASM OF PAROTID    Examination:  MR ORBIT [facility] NECK W AND WO CON  - [id number] - May  2 2011   10:54AM  Accession No:  [id number]  Reason:  NEOPLASM MALIGNANT PAROTID GLAND      REPORT:  INDICATION: Left parotid carcinoma. Multiple sagittal, axial and coronal images were performed through the   maxillofacial region with attention to the skull base utilizing T1, T2, fat   saturation and pre- and postgadolinium administration with 15 mL. Patient status post left parotidectomy. There is increased signal intensity   in the mastoid tip on the left without definite evidence of destruction most   consistent with inflammatory changes. There is a small mucus retention cyst   in the maxillary sinuses inferiorly. At the left parotid resection site,   there is a small amount of enhancing tissue. Just inferior to this there is   an 8mm enhancing nodule also in the parotid resection site. No adenopathy is   identified in the remainder of the neck. There are degenerative changes in the cervical spine with stenosis at C3-C4. IMPRESSION:  1. Patient status post left parotidectomy. At the postoperative site between   the mastoid tip and left mandible there is 9 mm ill-defined focus of   enhancement which could be post operative or recurrent tumor. Slightly   inferior to this is an 8mm focus of enhancement  and biopsy will be   performed of this area. 2. There is increased signal intensity in the left mastoid air cells without   evidence of definite bone destruction and findings are nonspecific but most   likely inflammatory.           Interpreting/Reading Doctor: Pascale Miramontes ([id number])  Transcribed: n/a on 05/03/2011  Approved: Pascale Miramontes ([id number])  05/03/2011          Distribution:  Attending Doctor: Elvie Doan              Lab Review  Lab Results   Component Value Date/Time    WBC 2.3 (L) 02/07/2018 07:29 AM    HCT 22.7 (L) 02/07/2018 07:29 AM    HGB 7.5 (L) 02/07/2018 07:29 AM    PLATELET 77 (L) 69/86/8437 07:29 AM     Lab Results   Component Value Date/Time    Sodium 138 02/06/2018 10:32 AM    Potassium 4.2 02/06/2018 10:32 AM    Chloride 103 02/06/2018 10:32 AM    CO2 24 02/06/2018 10:32 AM    Glucose 121 (H) 02/06/2018 10:32 AM    BUN 24 (H) 02/06/2018 10:32 AM    Creatinine 1.30 02/06/2018 10:32 AM    Calcium 9.4 02/06/2018 10:32 AM     No components found for: TROPQUANT  No results found for: MONTRELL    Signed:  Nury Landaverde.  Fernando Mercury, DO  2/7/2018  2:29 PM

## 2018-02-07 NOTE — PROGRESS NOTES
Problem: Falls - Risk of  Goal: *Absence of Falls  Document Dalia Fall Risk and appropriate interventions in the flowsheet.    Outcome: Progressing Towards Goal  Fall Risk Interventions:  Mobility Interventions: Patient to call before getting OOB         Medication Interventions: Evaluate medications/consider consulting pharmacy, Patient to call before getting OOB

## 2018-02-07 NOTE — PROCEDURES
[facility]  *** FINAL REPORT ***    Name: Traci Peñaloza  MRN: [id number]    Inpatient  : 10 Feb 1935  HIS Order #: [id number]  [id number] [facility] Visit #: [id number]  Date: 2018    TYPE OF TEST: Cerebrovascular Duplex    REASON FOR TEST  Syncope    Right Carotid:-             Proximal               Mid                 Distal  cm/s  Systolic  Diastolic  Systolic  Diastolic  Systolic  Diastolic  CCA:     68.7      16.0                            82.0      14.0  Bulb:  ECA:    109.0  ICA:     86.0      23.0                            87.0      23.0  ICA/CCA:  1.0       1.6    ICA Stenosis:    Right Vertebral:-  Finding: Antegrade  Sys:       75.0  Neyda:    Right Subclavian:    Left Carotid:-            Proximal                Mid                 Distal  cm/s  Systolic  Diastolic  Systolic  Diastolic  Systolic  Diastolic  CCA:     68.8      20.0                            74.0      20.0  Bulb:  ECA:     74.0  ICA:     93.0      27.0                            86.0      22.0  ICA/CCA:  1.3       1.4    ICA Stenosis:    Left Vertebral:-  Finding: Antegrade  Sys:       69.0  Neyda:    Left Subclavian:    INTERPRETATION/FINDINGS  PROCEDURE:  Color duplex ultrasound imaging of extracranial  cerebrovascular arteries. FINDINGS:       Right:  Internal carotid velocity is within normal limits. There   is narrowing of the internal carotid flow channel on color Doppler  imaging and calcific plaque on B-mode imaging, consistent with less  than 50 percent stenosis. The common and external carotid arteries  are patent and without evidence of hemodynamically significant  stenosis. Mild common carotid wall thickening seen. Left:  Internal carotid velocity is within normal limits. There  is narrowing of the internal carotid flow channel on color Doppler  imaging and calcific plaque on B-mode imaging, consistent with less  than 50 percent stenosis.   The common and external carotid arteries  are patent and without evidence of hemodynamically significant  stenosis. IMPRESSION:  Consistent with less than 50% stenosis of the internal  carotids. Vertebrals are patent with antegrade flow. ADDITIONAL COMMENTS    I have personally reviewed the data relevant to the interpretation of  this  study.     TECHNOLOGIST: Jose Francisco White RVT  Signed: 02/07/2018 03:25 PM    PHYSICIAN: Ricardo Leon MD  Signed: 02/09/2018 05:53 PM

## 2018-02-07 NOTE — PROGRESS NOTES
Problem: Dysphagia (Adult)  Goal: *Acute Goals and Plan of Care (Insert Text)  Speech Therapy Goals  Initiated 2/7/2018  1. Patient will tolerate dental soft diet with thin liquids without signs/symptoms of aspiration given no cues within 7 day(s). Speech LAnguage Pathology bedside swallow evaluation  Patient: Cong Gallego (52 y.o. male)  Date: 2/7/2018  Primary Diagnosis: Syncope        Precautions:        ASSESSMENT :  Based on the objective data described below, the patient presents with mild-moderate oral dysphagia characterized by restricted jaw opening, labial and lingual weakness with reduced ROM. Dysphagia results in slow mastication and oral transit. Patient reports globus sensation with puree and solid foods which clears with liquid wash. Globus may be related to GERD and/or mechanical obstruction due to tumor. Patient tolerated all PO trial without overt s/s aspiration. Overall patient with mildly increased risk of aspiration given dysphagia however patient independently uses compensatory strategies to reduce risk of aspiration. Given bedside presentation feel patient is safe for dental soft diet. Patient will benefit from skilled intervention to address the above impairments. Patients rehabilitation potential is considered to be Fair  Factors which may influence rehabilitation potential include:   []            None noted  []            Mental ability/status  [x]            Medical condition  []            Home/family situation and support systems  []            Safety awareness  []            Pain tolerance/management  []            Other:      PLAN :  Recommendations and Planned Interventions:  Dental soft diet  Thin liquids  Small single bites  Alternate solids and liquids  Frequency/Duration: Patient will be followed by speech-language pathology 2 times a week to address goals. Discharge Recommendations:  To Be Determined     SUBJECTIVE:   Patient stated My jaw won't open.    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    Stroke ([address] 2005     Past Surgical History:   Procedure Laterality Date    HX APPENDECTOMY      HX HEENT  5/2011    parotid gland surgery    HX ORTHOPAEDIC  1980's    Rotator cuff repair-RIGHT    HX OTHER SURGICAL      keloid removed left shoulder    HX PACEMAKER  2016    HX UROLOGICAL      prostate surgery     Prior Level of Function/Home Situation:      Diet prior to admission: Patient reports soft diet  Current Diet:  Regular   Cognitive and Communication Status:  Neurologic State: Alert  Orientation Level: Oriented to person, Oriented to place, Oriented to situation  Cognition: Appropriate decision making, Appropriate for age attention/concentration, Appropriate safety awareness, Follows commands           Oral Assessment:  Oral Assessment  Labial: Left droop; Decreased seal  Dentition: Natural  Lingual: Left deviation;Decreased strength  Velum: Unable to visualize  Mandible: Restricted  P.O. Trials:  Patient Position: Sitting edge of bed  Vocal quality prior to P.O.: Low volume  Consistency Presented: Thin liquid;Puree; Solid  How Presented: Self-fed/presented;Cup/sip;Spoon;Straw     Bolus Acceptance: No impairment  Bolus Formation/Control: Impaired  Type of Impairment: Delayed;Mastication  Propulsion: Delayed (# of seconds)  Oral Residue: None  Initiation of Swallow: No impairment  Laryngeal Elevation: Functional  Aspiration Signs/Symptoms: None  Pharyngeal Phase Characteristics: Foreign body sensation             Oral Phase Severity: Mild-moderate  Pharyngeal Phase Severity : No impairment    NOMS:   The NOMS functional outcome measure was used to quantify this patient's level of swallowing impairment. Based on the NOMS, the patient was determined to be at level 5 for swallow function         NOMS Swallowing Levels:  Level 1 (CN): NPO  Level 2 (CM): NPO but takes consistency in therapy  Level 3 (CL): Takes less than 50% of nutrition p.o. and continues with nonoral feedings; and/or safe with mod cues; and/or max diet restriction  Level 4 (CK): Safe swallow but needs mod cues; and/or mod diet restriction; and/or still requires some nonoral feeding/supplements  Level 5 (CJ): Safe swallow with min diet restriction; and/or needs min cues  Level 6 (CI): Independent with p.o.; rare cues; usually self cues; may need to avoid some foods or needs extra time  Level 7 ([address] [place] [address]): Independent for all p.o.  YAN. (2003). National Outcomes Measurement System (NOMS): Adult Speech-Language Pathology User's Guide. After treatment:   []            Patient left in no apparent distress sitting up in chair  [x]            Patient left in no apparent distress in bed  [x]            Call bell left within reach  [x]            Nursing notified  []            Caregiver present  []            Bed alarm activated    COMMUNICATION/EDUCATION:   The patients plan of care including recommendations, planned interventions, and recommended diet changes were discussed with: Registered Nurse. Patient was educated regarding role of SLP, plan of care, diet consistency and compensatory strategies. Patient verbalized and demonstrated understanding. []            Posted safety precautions in patient's room. [x]            Patient/family have participated as able in goal setting and plan of care. [x]            Patient/family agree to work toward stated goals and plan of care.   []            Patient understands intent and goals of therapy, but is neutral about his/her participation. []            Patient is unable to participate in goal setting and plan of care.     Thank you for this referral.  Rayna Mann SLP  Time Calculation: 20 mins

## 2018-02-07 NOTE — PROCEDURES
Patient Name: Karen Lane  : 1935  Age: 80 y.o. Ordering physician: No ref. provider found  Date of EE18  Interpreting physician: Isaias Delvalle DO      ELECTROENCEPHALOGRAM REPORT     PROCEDURE: EEG. CLINICAL INDICATION: The patient is a 80 y.o. male who is being evaluated for baseline electro cerebral activities and to rule out seizure focus. EEG CLASSIFICATION: Essentially normal awake and drowsy    DESCRIPTION OF THE RECORD:   The background of this recording contains a posteriorly-located occipital alpha rhythm of 9 Hz that attenuates with eye opening. Throughout the recording, there were no clear areas of focal slowing nor spike or spike-and-wave discharges seen. Hyperventilation was not performed. Photic stimulation did not produce a significant driving response. During the recording the patient did not achieve stage II sleep    INTERPRETATION: This is a normal electroencephalogram with the patient   awake and drowsy, showing no clear focal abnormalities or epileptiform   activity. A normal EEG doesn't not rule out seizures. Clinical correlation recommended.       Isaias Delvalle DO  Diplomate, American Board of Psychiatry & Neurology (Neurology)

## 2018-02-07 NOTE — PROGRESS NOTES
Chart reviewed and met with patient at bedside to introduce role and to assess for any needs/concerns regarding disposition. Patient admitted from home after having a syncopal episode. 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 Patient reports that he lives alone in his own home and has family support from his son and daughter. Dr Sherrie Lord is his PCP and he uses [facility] pharmacy in [place] for his medications. He does not utilize any DME at home. He anticipates discharge to his home with family support when medically stable. Will follow and assist as needed. Care Management Interventions  PCP Verified by CM:  Yes (Dr Sherrie Lord)  Palliative Care Criteria Met (RRAT>21 & CHF Dx)?: No  Mode of Transport at Discharge:  (Family car)  Transition of Care Consult (CM Consult): Discharge Planning  Physical Therapy Consult: Yes  Occupational Therapy Consult: Yes  Speech Therapy Consult: Yes  Current Support Network: Lives Alone, Family Lives [place], Own Home  Confirm Follow Up Transport: Family  Plan discussed with Pt/Family/Caregiver: Yes  Discharge Location  Discharge Placement:  (Home vs home with Marcello Fair)    Ines Almanza [facility]  Ext [phone]

## 2018-02-07 NOTE — PROGRESS NOTES
Problem: Falls - Risk of  Goal: *Absence of Falls  Document Dalia Fall Risk and appropriate interventions in the flowsheet.    Outcome: Progressing Towards Goal  Fall Risk Interventions:  Mobility Interventions: Patient to call before getting OOB         Medication Interventions: Evaluate medications/consider consulting pharmacy

## 2018-02-07 NOTE — CDMP QUERY
Please clarify if this patient is (was) being treated/managed for:     => Compression of brain in the setting of parotid gland tumor treated with head CT, neuro checks and neuro surg consult  => Other explanation of clinical findings  => Unable to determine (no explanation for clinical findings)    The medical record reflects the following clinical findings, treatment, and risk factors. Risk Factors:  h/o parotid gland tumor    Clinical Indicators:  HEAD CT:   \"This mass is displacing the adjacent dura medially and there is new mass effect on the left temporal lobe\"  H&P IMP:    Syncope with possible left-sided weakness. The patient has extension of what appears to be a parotid gland tumor into the parietal lobe and most likely that is causing patient's symptoms. Treatment: Head CT; neuro checks; neuro surg consult    Please clarify and document your clinical opinion in the progress notes and discharge summary including the definitive and/or presumptive diagnosis, (suspected or probable), related to the above clinical findings. Please include clinical findings supporting your diagnosis. Thank You  Dinah Nichole CDMP  [email or web address]  [phone]

## 2018-02-07 NOTE — PROGRESS NOTES
Renal Dosing/Monitoring  Medication: Famotidine   Current regimen:  20 mg PO every 12 hr  Recent Labs      02/06/18   1032   CREA  1.30   BUN  24*     Estimated CrCl:  37 ml/min  Plan: Change to 20 mg PO daily  per [address] [place] [address] P&T Committee Protocol with respect to renal function. Pharmacy will continue to monitor patient daily and will make dosage adjustments based upon changing renal function.     Eve Parnell, PharmD, BCPS

## 2018-02-07 NOTE — CONSULTS
Neurosurgery  Patient of Dr. Mikaela Green with multiple recurrences of parotid tumor,   Now with bout of syncope. Another syncopal episode last year. LEFT Upper and LE weakness - (left parotid tumor) has been going on for some time. Agree with cardiac and neurological workup.     Will discuss with Dr. Mikaela Green tomorrow

## 2018-02-07 NOTE — PROGRESS NOTES
Problem: Pressure Injury - Risk of  Goal: *Prevention of pressure ulcer  Outcome: Progressing Towards Goal  Patient turns self in bed, encouraged patient to turn frequently and assisted patient with repositioning throughout shift. Skin kept dry and free from moisture, lift sheet used.

## 2018-02-07 NOTE — PROGRESS NOTES
Orders received, chart reviewed and patient evaluated by physical therapy. Recommend patient to discharge to home pending progress with skilled acute care physical therapy. Recommend with nursing patient to complete as able in order to maintain strength, endurance and independence: OOB to chair 3x/day with assist X 1 and ambulating with assist X 1 using a rolling walker. Thank you for your assistance. Full evaluation to follow.      Charlette Anderson, PT

## 2018-02-07 NOTE — PROGRESS NOTES
Primary Nurse Yeison Tejeda RN and Alaina Hsieh RN performed a dual skin assessment on this patient No impairment noted  Dominik score is 23

## 2018-02-07 NOTE — PROGRESS NOTES
Hospitalist Progress Note  Danuta Montague MD  Answering service: [phone] from in house phone        Date of Service:  2018  NAME:  Katlin Leach  :  1935  MRN:  [id number]      Admission Summary:   CHIEF COMPLAINT:  Syncope.     HISTORY OF PRESENT ILLNESS:  The patient is an 71-year-old gentleman with past medical history of parotid tumor, status post radiation as well as three surgeries; hypertension, hypercholesterolemia, stroke, arthritis, GERD and pacemaker placement, who presents to the hospital with the above-mentioned symptoms. The patient reports that his symptoms happened this morning. He was eating breakfast when he had a sudden onset of syncope and passed out. The patient does not remember how long he was passed out, does not recall much details but does report that he did not have any prodromal symptoms; did not have any chest pain, shortness of breath, lightheadedness, dizziness, or any changes in his neurological status. The patient reports that he has a left-sided parotid tumor that was diagnosed \"many years back. \"  The patient reports that he received multiple doses of radiation and also underwent surgery three times, the last being around , but at that point of time, the full tumor could not be removed. 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 The patient reports that he has noticed that his left side is weaker than usual.  He has left lower extremity and upper extremity weakness that has been going on for many weeks. The patient also reports that he had some loose stools with one episode yesterday and also had some chills. The patient denies any other complaints or problems.   Denies any headache, blurry vision, trouble swallowing or trouble with speech; any chest pain, shortness of breath, cough, fever, chills, abdominal pain, constipation, diarrhea, urinary symptoms, focal or generalized neurological weakness, recent travel, sick contacts, falls, injuries or any other concerns or problems besides all those mentioned above in terms of neurological changes. The patient denies any other complaints or problems    Interval history / Subjective:       2/7:  Daughter/pt relate prolong recovery from syncope; will obtain  EEG, the ECHO pending, the monitor is sinus and sometimes paced. , no murmur on exam ( yield of ECHO would be expected to be low)      Assessment & Plan:     Syncope, suspect simple but as prolonged arousal, will obtain EEG. Code status: full   DVT prophylaxis: SCD    Care Plan discussed with: Patient/Family and Nurse  Disposition: Home w/Family and TBD     Hospital Problems  Date Reviewed: 2/6/2018          Codes Class Noted POA    * (Principal)Syncope ICD-10-CM: R55  ICD-9-CM: 780.2  2/6/2018 Unknown                Review of Systems:   A comprehensive review of systems was negative except for that written in the HPI. Vital Signs:    Last 24hrs VS reviewed since prior progress note. Most recent are:  Visit Vitals    /63 (BP 1 Location: Left arm, BP Patient Position: At rest)    Pulse 66    Temp 97.9 °F (36.6 °C)    Resp 16    Ht 5' 4.96\" (1.65 m)    Wt 59.7 kg (131 lb 9.8 oz)    SpO2 95%    BMI 21.93 kg/m2         Intake/Output Summary (Last 24 hours) at 02/07/18 1032  Last data filed at 02/06/18 2243   Gross per 24 hour   Intake              200 ml   Output              200 ml   Net                0 ml        Physical Examination:             Constitutional:  No acute distress, cooperative, pleasant    ENT:  Oral mucous moist, oropharynx benign. Neck supple,    Resp:  CTA bilaterally. No wheezing/rhonchi/rales. No accessory muscle use   CV:  Regular rhythm, normal rate, no murmurs, gallops, rubs    GI:  Soft, non distended, non tender.  normoactive bowel sounds, no hepatosplenomegaly     Musculoskeletal:  No edema, warm, 2+ pulses throughout    Neurologic:  Moves all extremities. AAOx3, CN II-XII reviewed     Psych:  Good insight, Not anxious nor agitated. Skin:  Good turgor, no rashes or ulcers       Data Review:    Review and/or order of clinical lab test      Labs:     Recent Labs      02/07/18   0729  02/06/18   1032   WBC  2.3*  2.8*   HGB  7.5*  9.4*   HCT  22.7*  29.0*   PLT  77*  100*     Recent Labs      02/06/18   1032   NA  138   K  4.2   CL  103   CO2  24   BUN  24*   CREA  1.30   GLU  121*   CA  9.4     Recent Labs      02/06/18   1032   SGOT  38*   ALT  14   AP  435*   TBILI  0.4   TP  7.4   ALB  3.1*   GLOB  4.3*     No results for input(s): INR, PTP, APTT in the last 72 hours. No lab exists for component: INREXT   No results for input(s): FE, TIBC, PSAT, FERR in the last 72 hours. Lab Results   Component Value Date/Time    Folate 26.4 (H) 02/06/2018 10:32 AM      No results for input(s): PH, PCO2, PO2 in the last 72 hours.   Recent Labs      02/06/18   1659  02/06/18   1032   TROIQ  <0.04  <0.04     Lab Results   Component Value Date/Time    Cholesterol, total 114 02/07/2018 04:34 AM    HDL Cholesterol 38 02/07/2018 04:34 AM    LDL, calculated 59 02/07/2018 04:34 AM    Triglyceride 85 02/07/2018 04:34 AM    CHOL/HDL Ratio 3.0 02/07/2018 04:34 AM     Lab Results   Component Value Date/Time    Glucose (POC) 116 (H) 11/07/2016 12:09 PM    Glucose (POC) 99 11/07/2016 06:22 AM    Glucose (POC) 114 (H) 11/06/2016 11:38 PM    Glucose (POC) 149 (H) 11/06/2016 06:14 PM    Glucose (POC) 180 (H) 11/06/2016 11:47 AM     Lab Results   Component Value Date/Time    Color YELLOW/STRAW 02/06/2018 01:09 PM    Appearance CLEAR 02/06/2018 01:09 PM    Specific gravity 1.023 02/06/2018 01:09 PM    pH (UA) 5.0 02/06/2018 01:09 PM    Protein 30 (A) 02/06/2018 01:09 PM    Glucose NEGATIVE  02/06/2018 01:09 PM    Ketone NEGATIVE  02/06/2018 01:09 PM    Bilirubin NEGATIVE  02/06/2018 01:09 PM    Urobilinogen 1.0 02/06/2018 01:09 PM    Nitrites NEGATIVE  02/06/2018 01:09 PM    Leukocyte Esterase NEGATIVE  02/06/2018 01:09 PM    Epithelial cells FEW 02/06/2018 01:09 PM    Bacteria NEGATIVE  02/06/2018 01:09 PM    WBC 0-4 02/06/2018 01:09 PM    RBC 0-5 02/06/2018 01:09 PM         Medications Reviewed:     Current Facility-Administered Medications   Medication Dose Route Frequency    famotidine (PEPCID) tablet 20 mg  20 mg Oral DAILY    atenolol (TENORMIN) tablet 50 mg  50 mg Oral 7am    sodium chloride (NS) flush 5-10 mL  5-10 mL IntraVENous Q8H    sodium chloride (NS) flush 5-10 mL  5-10 mL IntraVENous PRN    acetaminophen (TYLENOL) tablet 650 mg  650 mg Oral Q4H PRN    Or    acetaminophen (TYLENOL) solution 650 mg  650 mg Per NG tube Q4H PRN    Or    acetaminophen (TYLENOL) suppository 650 mg  650 mg Rectal Q4H PRN    0.9% sodium chloride infusion  75 mL/hr IntraVENous CONTINUOUS    aspirin chewable tablet 81 mg  81 mg Oral DAILY    lovastatin (MEVACOR) tablet 40 mg  40 mg Oral QHS     ______________________________________________________________________  EXPECTED LENGTH OF STAY: - - -  ACTUAL LENGTH OF STAY:          1                 Humberto Olivo MD

## 2018-02-07 NOTE — PROGRESS NOTES
Problem: Mobility Impaired (Adult and Pediatric)  Goal: *Acute Goals and Plan of Care (Insert Text)  Physical Therapy Goals  Initiated 2/7/2018  1. Patient will move from supine to sit and sit to supine , scoot up and down and roll side to side in bed with independence within 7 day(s). 2.  Patient will transfer from bed to chair and chair to bed with modified independence using the least restrictive device within 7 day(s). 3.  Patient will perform sit to stand with modified independence within 7 day(s). 4.  Patient will ambulate with modified independence for 300 feet with the least restrictive device within 7 day(s). 5.  Patient will ascend/descend 3 stairs with one handrail(s) with modified independence within 7 day(s). physical Therapy EVALUATION  Patient: Hilary Ramirez (6 y.o. male)  Date: 2/7/2018  Primary Diagnosis: Syncope        Precautions:   Fall    ASSESSMENT :  Based on the objective data described below, the patient presents with mobility near his baseline, mod I using a rolling walker, admitted for syncope (has a history of syncope as well). PT eval was performed as process of getting him onto the stretcher to leave the floor for a test. Orthostatics were performed. Supine at test /85 and HR 62. Sitting /63 and HR 65. Standing /46 and HR 79. Pt was not symptomatic with declining diastolic BP. Overall provided contact guard to stand by assist. Anticipate steady gains with mobility allowing for discharge back to home once medically stable, likely without any needs for PT services. 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 .    Patient will benefit from skilled intervention to address the above impairments.   Patients rehabilitation potential is considered to be Good  Factors which may influence rehabilitation potential include:   []         None noted  []         Mental ability/status  [x] Medical condition  [x]         Home/family situation and support systems, lives alone but has supportive family per chart and rounds  []         Safety awareness  []         Pain tolerance/management  []         Other:      PLAN :  Recommendations and Planned Interventions:  [x]           Bed Mobility Training             []    Neuromuscular Re-Education  [x]           Transfer Training                   []    Orthotic/Prosthetic Training  [x]           Gait Training                         []    Modalities  [x]           Therapeutic Exercises           []    Edema Management/Control  [x]           Therapeutic Activities            [x]    Patient and Family Training/Education  []           Other (comment):    Frequency/Duration: Patient will be followed by physical therapy  5 times a week to address goals. Discharge Recommendations: None  Further Equipment Recommendations for Discharge: none     SUBJECTIVE:   Patient stated I always use my walker at home.     OBJECTIVE DATA SUMMARY:   Consult received, chart reviewed, pt cleared by nursing.   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    Stroke ([address] 2005     Past Surgical History:   Procedure Laterality Date    HX APPENDECTOMY      HX HEENT  5/2011    parotid gland surgery    HX ORTHOPAEDIC  1980's    Rotator cuff repair-RIGHT    HX OTHER SURGICAL      keloid removed left shoulder    HX PACEMAKER  2016    HX UROLOGICAL      prostate surgery     Prior Level of Function/Home Situation: mod I using rolling walker  Personal factors and/or comorbidities impacting plan of care: history of syncope, lives alone, hearing loss favor right ear, brain tumor    Home Situation  Home Environment: Private residence  # Steps to Enter: 3  Rails to Enter: Yes  Hand Rails : Bilateral  One/Two Story Residence: One story  Support Systems: Family member(s) (hired aide [phone] )  Patient Expects to be Discharged to[de-identified] Private residence  Current DME Used/Available at Home: Dimas Aloe, rolling    EXAMINATION/PRESENTATION/DECISION MAKING:   Critical Behavior:  Neurologic State: Alert  Orientation Level: Oriented to person, Oriented to place, Oriented to situation  Cognition: Appropriate decision making, Follows commands     Hearing: right hearing aide      Skin:  Refer to MD and nursing notes  Edema: none noted  Range Of Motion:  AROM: Generally decreased, functional                       Strength:    Strength: Generally decreased, functional                    Tone & Sensation:                                  Coordination:     Vision:      Functional Mobility:  Bed Mobility:     Supine to Sit: Stand-by asssistance        Transfers:  Sit to Stand: Contact guard assistance  Stand to Sit: Contact guard assistance                       Balance:   Sitting: Intact; Without support  Standing: Intact; With support  Ambulation/Gait Training:  Distance (ft): 25 Feet (ft)  Assistive Device: Gait belt;Walker, rolling  Ambulation - Level of Assistance: Contact guard assistance        Gait Abnormalities: Decreased step clearance        Base of Support: Narrowed     Speed/Cornelia: Slow  Step Length: Left shortened;Right shortened                     Stairs:               Therapeutic Exercises:   Timed up and go:    Timed Get Up And Go Test: 18 (extrapolated)     Timed Up and Go and G-code impairment scale:  Percentage of Impairment CH    0%   CI    1-19% CJ    20-39% CK    40-59% CL    60-79% CM    80-99% CN     100%   Timed   Score 0-56 10 11-12 13-14 15-16 17-18 19 20       < than 10 seconds=Normal  Greater then 13.5 seconds (in elderly)=Increased fall risk   Sidney BELLA, [place] Patrick, Jennifer DRAPER. Predicting the probability for falls in community dwelling older adults using the Timed Up and Go Test. Phys Ther. 2000;80:896-903. Functional Measure:      G codes: In compliance with CMSs Claims Based Outcome Reporting, the following G-code set was chosen for this patient based on their primary functional limitation being treated: The outcome measure chosen to determine the severity of the functional limitation was the TUG with a score of 18 which was correlated with the impairment scale. ? Mobility - Walking and Moving Around:     - CURRENT STATUS: CL - 60%-79% impaired, limited or restricted    - GOAL STATUS: CI - 1%-19% impaired, limited or restricted    - D/C STATUS:  ---------------To be determined---------------      Physical Therapy Evaluation Charge Determination   History Examination Presentation Decision-Making   HIGH Complexity :3+ comorbidities / personal factors will impact the outcome/ POC  LOW Complexity : 1-2 Standardized tests and measures addressing body structure, function, activity limitation and / or participation in recreation  MEDIUM Complexity : Evolving with changing characteristics  LOW Complexity : FOTO score of       Based on the above components, the patient evaluation is determined to be of the following complexity level: LOW     Pain:  Pain Scale 1: Numeric (0 - 10)  Pain Intensity 1: 0              Activity Tolerance:   See assessment above  Please refer to the flowsheet for vital signs taken during this treatment.   After treatment:   []         Patient left in no apparent distress sitting up in chair  [x]         Patient left in no apparent distress on stretcher with transporter   []         Call bell left within reach  [x]         Nursing notified  []         Caregiver present  []         Bed alarm activated    COMMUNICATION/EDUCATION:   The patients plan of care was discussed with: Registered Nurse. [x]         Fall prevention education was provided and the patient/caregiver indicated understanding. [x]         Patient/family have participated as able in goal setting and plan of care. [x]         Patient/family agree to work toward stated goals and plan of care. []         Patient understands intent and goals of therapy, but is neutral about his/her participation. []         Patient is unable to participate in goal setting and plan of care.     Thank you for this referral.  Leonard Rangel   Time Calculation: 17 mins

## 2018-02-08 ENCOUNTER — APPOINTMENT (OUTPATIENT)
Dept: ULTRASOUND IMAGING | Age: 83
DRG: 312 | End: 2018-02-08
Attending: INTERNAL MEDICINE
Payer: MEDICARE

## 2018-02-08 LAB
GLUCOSE BLD STRIP.AUTO-MCNC: 132 MG/DL (ref 65–100)
GLUCOSE BLD STRIP.AUTO-MCNC: 97 MG/DL (ref 65–100)
SERVICE CMNT-IMP: ABNORMAL
SERVICE CMNT-IMP: NORMAL

## 2018-02-08 PROCEDURE — 82962 GLUCOSE BLOOD TEST: CPT

## 2018-02-08 PROCEDURE — 74011250637 HC RX REV CODE- 250/637: Performed by: FAMILY MEDICINE

## 2018-02-08 PROCEDURE — 65270000029 HC RM PRIVATE

## 2018-02-08 PROCEDURE — 97165 OT EVAL LOW COMPLEX 30 MIN: CPT

## 2018-02-08 PROCEDURE — 76705 ECHO EXAM OF ABDOMEN: CPT

## 2018-02-08 PROCEDURE — 97535 SELF CARE MNGMENT TRAINING: CPT

## 2018-02-08 PROCEDURE — G8988 SELF CARE GOAL STATUS: HCPCS

## 2018-02-08 PROCEDURE — G8987 SELF CARE CURRENT STATUS: HCPCS

## 2018-02-08 PROCEDURE — 97110 THERAPEUTIC EXERCISES: CPT

## 2018-02-08 RX ADMIN — Medication 10 ML: at 21:33

## 2018-02-08 RX ADMIN — ASPIRIN 81 MG 81 MG: 81 TABLET ORAL at 09:26

## 2018-02-08 RX ADMIN — Medication 10 ML: at 06:00

## 2018-02-08 RX ADMIN — LOVASTATIN 40 MG: 20 TABLET ORAL at 21:33

## 2018-02-08 RX ADMIN — FAMOTIDINE 20 MG: 20 TABLET, FILM COATED ORAL at 09:26

## 2018-02-08 RX ADMIN — ATENOLOL 50 MG: 25 TABLET ORAL at 07:06

## 2018-02-08 NOTE — PROGRESS NOTES
Hospitalist Progress Note  Alondra Noe MD  Answering service: [phone] from in house phone        Date of Service:  2018  NAME:  Phoebe Peterson  :  1935  MRN:  [id number]      Admission Summary:   CHIEF COMPLAINT:  Syncope.     HISTORY OF PRESENT ILLNESS:  The patient is an 42-year-old gentleman with past medical history of parotid tumor, status post radiation as well as three surgeries; hypertension, hypercholesterolemia, stroke, arthritis, GERD and pacemaker placement, who presents to the hospital with the above-mentioned symptoms. The patient reports that his symptoms happened this morning. He was eating breakfast when he had a sudden onset of syncope and passed out. The patient does not remember how long he was passed out, does not recall much details but does report that he did not have any prodromal symptoms; did not have any chest pain, shortness of breath, lightheadedness, dizziness, or any changes in his neurological status. The patient reports that he has a left-sided parotid tumor that was diagnosed \"many years back. \"  The patient reports that he received multiple doses of radiation and also underwent surgery three times, the last being around , but at that point of time, the full tumor could not be removed. 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 The patient reports that he has noticed that his left side is weaker than usual.  He has left lower extremity and upper extremity weakness that has been going on for many weeks. The patient also reports that he had some loose stools with one episode yesterday and also had some chills. The patient denies any other complaints or problems.   Denies any headache, blurry vision, trouble swallowing or trouble with speech; any chest pain, shortness of breath, cough, fever, chills, abdominal pain, constipation, diarrhea, urinary symptoms, focal or generalized neurological weakness, recent travel, sick contacts, falls, injuries or any other concerns or problems besides all those mentioned above in terms of neurological changes. The patient denies any other complaints or problems    Interval history / Subjective:       2/7:  Daughter/pt relate prolong recovery from syncope; will obtain  EEG, the ECHO pending, the monitor is sinus and sometimes paced. , no murmur on exam ( yield of ECHO would be expected to be low)     2/8:  Discussed paucity of finding with daughter, mentioned the pericardial effusion, will obtain card comment and look at liver as has ascitics ; daughter recognized that there is probably no real treatment option available for pt's myriad medical  Issues,, anticipate dc home in am 2/9     Note: work up of 2 conditions instituted with consult and US as per below. Assessment & Plan:     Syncope, suspect simple but as prolonged arousal, will obtain EEG. Pericardial effusion, suspect chronic and not hemodynamically signifincant,  For US liver,  ( ascites) and card to comment,    Prostate cancer, stage 4, no treatment planned. Ascities , suspect chronic and 2n2 prostate cancer, other, will obtain ultrasound liver,     Parotic tumor, note and not susrgical not felt to contribute to recurrent syncope,   Seen by neurosurg. Code status: full   DVT prophylaxis: SCD    Care Plan discussed with: Patient/Family and Nurse  Disposition: Home w/Family and TBD     Hospital Problems  Date Reviewed: 2/6/2018          Codes Class Noted POA    * (Principal)Syncope ICD-10-CM: R55  ICD-9-CM: 780.2  2/6/2018 Unknown                Review of Systems:   A comprehensive review of systems was negative except for that written in the HPI. Vital Signs:    Last 24hrs VS reviewed since prior progress note.  Most recent are:  Visit Vitals    /56    Pulse 61    Temp 98.5 °F (36.9 °C)    Resp 14    Ht 5' 4.96\" (1.65 m)    Wt 61.7 kg (136 lb 0.4 oz)    SpO2 98%    BMI 22.66 kg/m2         Intake/Output Summary (Last 24 hours) at 02/08/18 0920  Last data filed at 02/07/18 1630   Gross per 24 hour   Intake                0 ml   Output              225 ml   Net             -225 ml        Physical Examination:             Constitutional:  No acute distress, cooperative, pleasant    ENT:  Oral mucous moist, oropharynx benign. Neck supple,    Resp:  CTA bilaterally. No wheezing/rhonchi/rales. No accessory muscle use   CV:  Regular rhythm, normal rate, no murmurs, gallops, rubs    GI:  Soft, non distended, non tender. normoactive bowel sounds, no hepatosplenomegaly     Musculoskeletal:  No edema, warm, 2+ pulses throughout    Neurologic:  Moves all extremities. AAOx3, CN II-XII reviewed     Psych:  Good insight, Not anxious nor agitated. Skin:  Good turgor, no rashes or ulcers       Data Review:    Review and/or order of clinical lab test      Labs:     Recent Labs      02/07/18   0729  02/06/18   1032   WBC  2.3*  2.8*   HGB  7.5*  9.4*   HCT  22.7*  29.0*   PLT  77*  100*     Recent Labs      02/06/18   1032   NA  138   K  4.2   CL  103   CO2  24   BUN  24*   CREA  1.30   GLU  121*   CA  9.4     Recent Labs      02/06/18   1032   SGOT  38*   ALT  14   AP  435*   TBILI  0.4   TP  7.4   ALB  3.1*   GLOB  4.3*     No results for input(s): INR, PTP, APTT in the last 72 hours. No lab exists for component: INREXT, INREXT   No results for input(s): FE, TIBC, PSAT, FERR in the last 72 hours. Lab Results   Component Value Date/Time    Folate 26.4 (H) 02/06/2018 10:32 AM      No results for input(s): PH, PCO2, PO2 in the last 72 hours.   Recent Labs      02/06/18   1659  02/06/18   1032   TROIQ  <0.04  <0.04     Lab Results   Component Value Date/Time    Cholesterol, total 114 02/07/2018 04:34 AM    HDL Cholesterol 38 02/07/2018 04:34 AM    LDL, calculated 59 02/07/2018 04:34 AM    Triglyceride 85 02/07/2018 04:34 AM    CHOL/HDL Ratio 3.0 02/07/2018 04:34 AM     Lab Results   Component Value Date/Time    Glucose (POC) 116 (H) 11/07/2016 12:09 PM    Glucose (POC) 99 11/07/2016 06:22 AM    Glucose (POC) 114 (H) 11/06/2016 11:38 PM    Glucose (POC) 149 (H) 11/06/2016 06:14 PM    Glucose (POC) 180 (H) 11/06/2016 11:47 AM     Lab Results   Component Value Date/Time    Color YELLOW/STRAW 02/06/2018 01:09 PM    Appearance CLEAR 02/06/2018 01:09 PM    Specific gravity 1.023 02/06/2018 01:09 PM    pH (UA) 5.0 02/06/2018 01:09 PM    Protein 30 (A) 02/06/2018 01:09 PM    Glucose NEGATIVE  02/06/2018 01:09 PM    Ketone NEGATIVE  02/06/2018 01:09 PM    Bilirubin NEGATIVE  02/06/2018 01:09 PM    Urobilinogen 1.0 02/06/2018 01:09 PM    Nitrites NEGATIVE  02/06/2018 01:09 PM    Leukocyte Esterase NEGATIVE  02/06/2018 01:09 PM    Epithelial cells FEW 02/06/2018 01:09 PM    Bacteria NEGATIVE  02/06/2018 01:09 PM    WBC 0-4 02/06/2018 01:09 PM    RBC 0-5 02/06/2018 01:09 PM         Medications Reviewed:     Current Facility-Administered Medications   Medication Dose Route Frequency    famotidine (PEPCID) tablet 20 mg  20 mg Oral DAILY    atenolol (TENORMIN) tablet 50 mg  50 mg Oral 7am    sodium chloride (NS) flush 5-10 mL  5-10 mL IntraVENous Q8H    sodium chloride (NS) flush 5-10 mL  5-10 mL IntraVENous PRN    acetaminophen (TYLENOL) tablet 650 mg  650 mg Oral Q4H PRN    Or    acetaminophen (TYLENOL) solution 650 mg  650 mg Per NG tube Q4H PRN    Or    acetaminophen (TYLENOL) suppository 650 mg  650 mg Rectal Q4H PRN    aspirin chewable tablet 81 mg  81 mg Oral DAILY    lovastatin (MEVACOR) tablet 40 mg  40 mg Oral QHS     ______________________________________________________________________  EXPECTED LENGTH OF STAY: 2d 4h  ACTUAL LENGTH OF STAY:          2                 Nestor Klein MD

## 2018-02-08 NOTE — PROGRESS NOTES
Problem: Syncope  Goal: *Absence of injury  Outcome: Progressing Towards Goal  Bed in low position, locked bed wheels. Call bell and personal items within reach. Hourly rounding performed. Teach patient to arise slowly. No injury present. 0745- Bedside and Verbal shift change report given to [facility] (oncoming nurse) by Narda Charlton (offgoing nurse). Report included the following information SBAR, Kardex, Intake/Output, MAR, Recent Results and Cardiac Rhythm NSR/Paced.

## 2018-02-08 NOTE — PROGRESS NOTES
Cardiology Consult Note    CC: pericardial effusion   Reason for consult:  Pericardial effusion  Requesting MD:  Dr. Katie Velarde     Subjective:      Date of  Admission: 2/6/2018 10:21 AM     Admission type:Emergency    Linsey Huddleston is a 80 y.o. male admitted for Syncope. Patient complains of passing out at breakfast. He felt nauseated and then passed out. No chest pain or palpitations. His pacer was checked and was found to be functioning normally. An echo was ordered to make sure his cardiac status and it was showing moderate pericardiac effusion with no tamponade physiology. He denies any CP or SOB laying down flat in his bed right now. His past parotid tumor and XRT are well described. 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 The pacer did help him in that regard.     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      Marcus Salguero MD  Past Medical History:   Diagnosis Date    Arthritis     Cancer [facility]) 2004    malignant neoplasm of salivary glands    GERD (gastroesophageal reflux disease)     Hearing loss     bilateral    Heart failure ([address]     HAS PACER    Hypercholesteremia     Hypertension     medicated    Other ill-defined conditions(799.89)     facial nerve disorder    Other ill-defined conditions(799.89)     enlarged prostate    Other ill-defined conditions(799.89)     tumor on parotid gland    Stroke [facility]) 2005      Past Surgical History:   Procedure Laterality Date    HX APPENDECTOMY      HX HEENT  5/2011    parotid gland surgery    HX ORTHOPAEDIC  1980's    Rotator cuff repair-RIGHT    HX OTHER SURGICAL      keloid removed left shoulder    HX PACEMAKER  2016    HX UROLOGICAL      prostate surgery     No Known Allergies Family History   Problem Relation Age of Onset    Cancer Mother      UNKNOWN LOCATION    Anesth Problems Neg Hx       Current Facility-Administered Medications   Medication Dose Route Frequency    famotidine (PEPCID) tablet 20 mg  20 mg Oral DAILY    atenolol (TENORMIN) tablet 50 mg  50 mg Oral 7am    sodium chloride (NS) flush 5-10 mL  5-10 mL IntraVENous Q8H    sodium chloride (NS) flush 5-10 mL  5-10 mL IntraVENous PRN    acetaminophen (TYLENOL) tablet 650 mg  650 mg Oral Q4H PRN    Or    acetaminophen (TYLENOL) solution 650 mg  650 mg Per NG tube Q4H PRN    Or    acetaminophen (TYLENOL) suppository 650 mg  650 mg Rectal Q4H PRN    aspirin chewable tablet 81 mg  81 mg Oral DAILY    lovastatin (MEVACOR) tablet 40 mg  40 mg Oral QHS        Prior to Admission Medications:  Prior to Admission medications    Medication Sig Start Date End Date Taking? Authorizing Provider   aspirin delayed-release 81 mg tablet Take 1 Tab by mouth daily. 16  Yes Doroteo Davalos NP   HYDROcodone-acetaminophen [facility]) 5-325 mg per tablet Take 1 Tab by mouth every four (4) hours as needed. Max Daily Amount: 6 Tabs. 16  Yes Doroteo Davalos NP   atenolol (TENORMIN) 50 mg tablet Take 50 mg by mouth every morning. Yes Historical Provider        Review of Symptoms:  Except as noted in HPI, patient denies recent fever or chills, nausea, vomiting, diarrhea, hemoptysis, hematemesis, dysuria, myalgias, focal neurologic symptoms, ecchymosis, angioedema, odynophagia, dysphagia, sore throat, earache,rash, melena, hematochezia, depression, GERD, cold intolerance, petechia, bleeding gums, or significant weight loss. A comprehensive review of systems was negative except for that written in the HPI. Subjective:    24 hr VS reviewed, overall VSSAF  Temp (24hrs), Av.2 °F (36.8 °C), Min:98 °F (36.7 °C), Max:98.5 °F (36.9 °C)    Patient Vitals for the past 8 hrs:   Pulse   18 0706 61    No data found.    Patient Vitals for the past 8 hrs:   BP   02/08/18 0706 121/56          Intake/Output Summary (Last 24 hours) at 02/08/18 1113  Last data filed at 02/07/18 1630   Gross per 24 hour   Intake                0 ml   Output              225 ml   Net             -225 ml         Physical Exam (complete single organ system exam)      Visit Vitals    /56    Pulse 61    Temp 98.5 °F (36.9 °C)    Resp 14    Ht 5' 4.96\" (1.65 m)    Wt 61.7 kg (136 lb 0.4 oz)    SpO2 98%    BMI 22.66 kg/m2     General Appearance:  Well developed, well nourished,alert and oriented x 3, and individual in no acute distress. Ears/Nose/Mouth/Throat:   Hearing grossly normal.         Neck: Supple. Chest:   Lungs clear to auscultation bilaterally. Cardiovascular:  Regular rate and rhythm, S1, S2 normal, no murmur. Abdomen:   Soft, non-tender, bowel sounds are active. Extremities: No edema bilaterally. Skin: Warm and dry. Cardiographics    Telemetry: sinus with V paced beats  ECG: sinus with V paced beats  Echocardiogram: Abnormal, and reviewed by myself:     Labs: No results found for this or any previous visit (from the past 24 hour(s)).      Assessment:     Assessment:   Pericardial effusion; asymptomatic and no tamponade on echo  Ascites; he has some effusive condition or inflammatory   S/p pacer   Syncope; this time vasovagal type  Parotid tumor      Plan:   No further evaluation at this time  Will monitor with serial echo as an outpatient on this pericardial effusion    Sravani Platt MD

## 2018-02-08 NOTE — PROGRESS NOTES
Speech Path    Attempted to see for f/u swallow assessment however, patient NPO for abdominal US. Per patient, no difficulty with swallowing but dental soft diet remains \"too tough.' Likely would do better with mechanical soft. Given NPO status, will change once US completed and attempt to re-assess today depending on timing of US. Allie Collins MS, CCC-SLP, BCS-S

## 2018-02-08 NOTE — PROGRESS NOTES
TRANSFER - OUT REPORT:    Verbal report given to Mattie SHIPMAN(name) on Hayes Lopez  being transferred to [address] [place] [address](unit) for routine progression of care       Report consisted of patients Situation, Background, Assessment and   Recommendations(SBAR). Information from the following report(s) SBAR, Kardex, ED Summary, Procedure Summary, Intake/Output, MAR and Recent Results was reviewed with the receiving nurse. Lines:   Peripheral IV 02/07/18 Right Hand (Active)   Site Assessment Clean, dry, & intact 2/8/2018  4:15 AM   Phlebitis Assessment 0 2/8/2018  4:15 AM   Infiltration Assessment 0 2/8/2018  4:15 AM   Dressing Status Clean, dry, & intact 2/8/2018  4:15 AM   Dressing Type Transparent;Tape 2/8/2018  4:15 AM   Hub Color/Line Status Blue;Capped 2/8/2018  4:15 AM   Action Taken Open ports on tubing capped 2/8/2018  4:15 AM   Alcohol Cap Used Yes 2/8/2018  4:15 AM        Opportunity for questions and clarification was provided.       Patient transported with:   Tech, Patient Transport

## 2018-02-08 NOTE — PROGRESS NOTES
Problem: Mobility Impaired (Adult and Pediatric)  Goal: *Acute Goals and Plan of Care (Insert Text)  Physical Therapy Goals  Initiated 2/7/2018  1. Patient will move from supine to sit and sit to supine , scoot up and down and roll side to side in bed with independence within 7 day(s). 2.  Patient will transfer from bed to chair and chair to bed with modified independence using the least restrictive device within 7 day(s). 3.  Patient will perform sit to stand with modified independence within 7 day(s). 4.  Patient will ambulate with modified independence for 300 feet with the least restrictive device within 7 day(s). 5.  Patient will ascend/descend 3 stairs with one handrail(s) with modified independence within 7 day(s). physical Therapy TREATMENT  Patient: Wenceslao James (88 y.o. male)  Date: 2/8/2018  Diagnosis: Syncope Syncope       Precautions: Fall, Seizure  Chart, physical therapy assessment, plan of care and goals were reviewed. ASSESSMENT:  Pt received supine in bed, agreeable to general strengthening exercises. Pt bed positioned to mod chair position to complete U/LE exercises. Pt completed set x1 of UE exercises w/ active-assist to RUE and PROM to left (pt reported inability to lift LUE d/t past rotator cuff injury ~45 years ago); 2nd set completed w/ yellow thera-band reps x10. Pt able to complete BLE exercises below actively w/ manual assistance. Pt bed repositioned to supine, all needs met, items in reach. PT to continue to follow for progression of in/OOB activity as able and appropriate.  Continue w/PT recommendation: no further PT needs at d/c (pt has home aides M-F 8-12:30PM and 2-5:30PM per PT Eval note)  Progression toward goals:  [x]      Improving appropriately and progressing toward goals  []      Improving slowly and progressing toward goals  []      Not making progress toward goals and plan of care will be adjusted     PLAN:  Patient continues to benefit from skilled intervention to address the above impairments. Continue treatment per established plan of care. Discharge Recommendations:  None   Further Equipment Recommendations for Discharge:  None (pt owns RW per Eval note)     SUBJECTIVE:   Patient stated I can't lift that arm (left). I injured my rotator cuff and never got it fixed.     OBJECTIVE DATA SUMMARY:   Critical Behavior:  Neurologic State: Alert  Orientation Level: Oriented X4  Cognition: Follows commands, Appropriate safety awareness, Appropriate decision making, Appropriate for age attention/concentration  Safety/Judgement: Awareness of environment, Fall prevention, Good awareness of safety precautions, Home safety, Insight into deficits  Functional Mobility Training:    Therapeutic Exercises:   Pt bed positioned to chair position  BUE (2x10, 2nd set with yellow thera-band: Shoulder flexion (scap stabilization), Elbow flex/ext, IR/ER  BLE (1x10, manual resistance): SLR, LAQ's, hip flexion, Hip ABD/ADD  Pain:  Pain Scale 1: Numeric (0 - 10)  Pain Intensity 1: 0              Activity Tolerance:   Good  Please refer to the flowsheet for vital signs taken during this treatment.   After treatment:   [] Patient left in no apparent distress sitting up in chair  [x] Patient left in no apparent distress in bed  [x] Call bell left within reach  [x] Nursing notified  [] Caregiver present  [] Bed alarm activated    COMMUNICATION/COLLABORATION:   The patients plan of care was discussed with: Registered Nurse    Cammie Cuevas PTA   Time Calculation: 27 mins

## 2018-02-08 NOTE — PROGRESS NOTES
Problem: Self Care Deficits Care Plan (Adult)  Goal: *Acute Goals and Plan of Care (Insert Text)  Occupational Therapy Goals  Initiated 2/8/2018    1. Patient will perform bathing seated EOB with supervision/set-up within 7 days. 2.  Patient will perform LB dressing with Trevor within 7 days. 3.  Patient will perform toilet transfer in bathroom with CGA within 7 days. 4.  Patient will perform REJI UE ADL with supervision within 7 days. 5.  Patient will perform ADL standing for 5 minutes with RW support and SBA within 7 days. 6.  Patient will utilize fall prevention techniques during ADL tasks with min cues within 7 days. Occupational Therapy EVALUATION  Patient: Wenceslao James (24 y.o. 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 Pt alert and oriented x4. ADLs overall supervision/set-up to maxA; SBA for bed mobility, per PT, CGA with RW for mobility short distance. Pt reports he feels speech, swallow and LUE/LLE weakness have worsened recently. Pt BP stable during session and pt asymptomatic. Pt reports he lives alone, however, has home aids 5 days/week for ~7 hours a day to assists with IADLs and bathing. At this time, should pt have 24/7 supervision/assistance, recommend home health OT/PT; per RN, palliative to be consulted to establish long term plan of care d/t prognosis. Patient will benefit from skilled intervention to address the above impairments.   Patients rehabilitation potential is considered to be Fair  Factors which may influence rehabilitation potential include:   []             None noted  []             Mental ability/status  [x] Medical condition  []             Home/family situation and support systems  []             Safety awareness  []             Pain tolerance/management  []             Other:      PLAN :  Recommendations and Planned Interventions:  [x]               Self Care Training                  [x]        Therapeutic Activities  [x]               Functional Mobility Training    [x]        Cognitive Retraining  [x]               Therapeutic Exercises           [x]        Endurance Activities  [x]               Balance Training                   [x]        Neuromuscular Re-Education  [x]               Visual/Perceptual Training     [x]   Home Safety Training  [x]               Patient Education                 [x]        Family Training/Education  []               Other (comment):    Frequency/Duration: Patient will be followed by occupational therapy 3 times a week to address goals. Discharge Recommendations: Home Health with increased supervision/assistance  Further Equipment Recommendations for Discharge: none at this time     SUBJECTIVE:   Patient stated It feels like my throat is slowly closing up; and I'm losing my speech. 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 Quit driving 6 months ago. Has home aides M-F 9-1 and 2-5 who assists with IADLs and bathing. L facial palsy, h/o CVA and L parotid tumor, syncope. Occupations in which the patient is/was successful, what are the barriers preventing that success:   Performance Patterns (routines, roles, habits, and rituals):   Personal Interests and/or values:   Expanded or extensive additional review of patient history:     Home Situation  Home Environment: Private residence  # Steps to Enter: 3  Rails to Enter: Yes  Hand Rails : Bilateral  One/Two Story Residence: One story  Living Alone: Yes (has home aides 5x/week, 9-1 and 2-5)  Support Systems: Child(basim), Family member(s), Home care staff  Patient Expects to be Discharged to[de-identified] Private residence  Current DME Used/Available at Home: Trudell Piles, rolling  Tub or Shower Type: Tub/Shower combination  [x]  Right hand dominant   []  Left hand dominant    EXAMINATION OF PERFORMANCE DEFICITS:  Cognitive/Behavioral Status:  Neurologic State: Alert  Orientation Level: Oriented X4  Cognition: Follows commands; Appropriate safety awareness; Appropriate for age attention/concentration; Appropriate decision making  Perception: Appears intact  Perseveration: No perseveration noted  Safety/Judgement: Awareness of environment; Fall prevention;Good awareness of safety precautions; Home safety; Insight into deficits    Skin: appear intact    Edema: none noted in BUEs    Hearing: Auditory  Auditory Impairment: Hard of hearing, left side    Vision/Perceptual:    Tracking: Requires cues, head turns, or add eye shifts to track (L eye unable to track, ptosis)    Saccades: Additional eye shifts occurred during testing         Visual Fields: Difficulty detecting stimulus  in left lateral quadrant; Difficulty detecting stimulus in left lower quadrant; Difficulty detecting stimulus in left upper quadrant (L eye ptosis, impaired oculomotor movements L eye)  Diplopia: No         Corrective Lenses: Glasses    Range of Motion:    AROM: Generally decreased, functional (LUE limited shoulder flex/abd; pt reports d/t torn ligament )  PROM: Generally decreased, functional                      Strength:    Strength: Generally decreased, functional                Coordination:  Coordination: Generally decreased, functional  Fine Motor Skills-Upper: Left Impaired;Right Intact    Gross Motor Skills-Upper: Left Impaired;Right Intact    Tone & Sensation:    Tone: Abnormal  Sensation: Intact                      Balance:  Sitting: Impaired; Without support  Sitting - Static: Good (unsupported)  Sitting - Dynamic: Fair (occasional)  Standing: Impaired; With support (inferred)    Functional Mobility and Transfers for ADLs:  Bed Mobility:  Supine to Sit: Stand-by asssistance; Additional time  Sit to Supine: Contact guard assistance    Transfers:  Sit to Stand:  (NT, CGA inferred per PT assessment 2/7/18)  Toilet Transfer : Minimum assistance (inferred)    ADL Assessment:  Feeding: Minimum assistance    Oral Facial Hygiene/Grooming: Minimum assistance    Bathing: Minimum assistance (seated EOB)    Upper Body Dressing: Moderate assistance    Lower Body Dressing: Maximum assistance    Toileting: Moderate assistance                ADL Intervention and task modifications:       Grooming  Washing Face: Supervision/set-up    Upper Body Bathing  Bathing Assistance: Contact guard assistance  Position Performed: Seated edge of bed  Cues: Physical assistance;Verbal cues provided    Lower Body Bathing  Lower Body : Minimum assistance  Position Performed: Seated edge of bed    Upper Body [address]: Minimum  assistance    Lower Body Dressing Assistance  Socks: Total assistance (dependent)         Cognitive Retraining  Safety/Judgement: Awareness of environment; Fall prevention;Good awareness of safety precautions; Home safety; Insight into deficits       Functional Measure:  Barthel Index:    Bathin  Bladder: 10  Bowels: 10  Groomin  Dressin  Feedin  Mobility: 0  Stairs: 0  Toilet Use: 5  Transfer (Bed to Chair and Back): 10  Total: 50       Barthel and G-code impairment scale:  Percentage of impairment CH  0% CI  1-19% CJ  20-39% CK  40-59% CL  60-79% CM  80-99% CN  100%   Barthel Score 0-100 100 99-80 79-60 59-40 20-39 1-19   0   Barthel Score 0-20 20 17-19 13-16 9-12 5-8 1-4 0      The Barthel ADL Index: Guidelines  1. The index should be used as a record of what a patient does, not as a record of what a patient could do. 2. The main aim is to establish degree of independence from any help, physical or verbal, however minor and for whatever reason. 3. The need for supervision renders the patient not independent. 4. A patient's performance should be established using the best available evidence. Asking the patient, friends/relatives and nurses are the usual sources, but direct observation and common sense are also important. However direct testing is not needed. 5. Usually the patient's performance over the preceding 24-48 hours is important, but occasionally longer periods will be relevant. 6. Middle categories imply that the patient supplies over 50 per cent of the effort. 7. Use of aids to be independent is allowed. Julio Hester., Barthel, D.W. (7734). Functional evaluation: the Barthel Index. [facility] (14)2. Jen Cohen, LEONARD.J.MRAFAT, Beronica Barksdale., [facility] Ramos., [place], [address] [place] [address] (). Measuring the change indisability after inpatient rehabilitation; comparison of the responsiveness of the Barthel Index and Functional [place] Measure. Journal of Neurology, Neurosurgery, and Psychiatry, 66(4), 503-022. Augusto Ordoñez NSARA.A, LIZ Olivia, & Chun Bose, MVivianeA. (2004.) Assessment of post-stroke quality of life in cost-effectiveness studies:  The usefulness of the Barthel Index and the EuroQoL-5D. Quality of Life Research, 13, 573-83         G codes: In compliance with CMSs Claims Based Outcome Reporting, the following G-code set was chosen for this patient based on their primary functional limitation being treated: The outcome measure chosen to determine the severity of the functional limitation was the barthel index with a score of 50/100 which was correlated with the impairment scale. ? Self Care:     - CURRENT STATUS: CK - 40%-59% impaired, limited or restricted    - GOAL STATUS: CI - 1%-19% impaired, limited or restricted    - D/C STATUS:  ---------------To be determined---------------     Occupational Therapy Evaluation Charge Determination   History Examination Decision-Making   MEDIUM Complexity : Expanded review of history including physical, cognitive and psychosocial  history  HIGH Complexity : 5 or more performance deficits relating to physical, cognitive , or psychosocial skils that result in activity limitations and / or participation restrictions HIGH Complexity : Patient presents with comorbidities that affect occupational performance. Signifigant modification of tasks or assistance (eg, physical or verbal) with assessment (s) is necessary to enable patient to complete evaluation       Based on the above components, the patient evaluation is determined to be of the following complexity level: MEDIUM     Activity Tolerance:   VSS    Please refer to the flowsheet for vital signs taken during this treatment. After treatment:   [] Patient left in no apparent distress sitting up in chair  [x] Patient left in no apparent distress in bed  [x] Call bell left within reach  [x] Nursing notified  [] Caregiver present  [] Bed alarm activated    COMMUNICATION/EDUCATION:   The patients plan of care was discussed with: Physical Therapist and Registered Nurse. [x] Home safety education was provided and the patient/caregiver indicated understanding.   [x] Patient/family have participated as able in goal setting and plan of care. [x] Patient/family agree to work toward stated goals and plan of care. [] Patient understands intent and goals of therapy, but is neutral about his/her participation. [] Patient is unable to participate in goal setting and plan of care. This patients plan of care is appropriate for delegation to PEGGY.     Thank you for this referral.  Leola Aaron OT  Time Calculation: 27 mins

## 2018-02-08 NOTE — PROGRESS NOTES
Bedside shift change report given to Dalila SHIPMAN (oncoming nurse) by Isael Ko (offgoing nurse).  Report included the following information SBAR, Kardex and ED Summary, MAR .

## 2018-02-08 NOTE — PROGRESS NOTES
Primary Nurse Erin Banks RN and Kristyn LANCASTER, RN performed a dual skin assessment on this patient No impairment noted  Dominik score is 17    Scars on back and head

## 2018-02-09 VITALS
BODY MASS INDEX: 22.66 KG/M2 | RESPIRATION RATE: 16 BRPM | HEIGHT: 65 IN | SYSTOLIC BLOOD PRESSURE: 112 MMHG | TEMPERATURE: 98.2 F | HEART RATE: 68 BPM | WEIGHT: 136.02 LBS | DIASTOLIC BLOOD PRESSURE: 59 MMHG | OXYGEN SATURATION: 100 %

## 2018-02-09 PROBLEM — R63.6 LOW WEIGHT: Status: ACTIVE | Noted: 2018-02-09

## 2018-02-09 LAB
GLUCOSE BLD STRIP.AUTO-MCNC: 101 MG/DL (ref 65–100)
SERVICE CMNT-IMP: ABNORMAL

## 2018-02-09 PROCEDURE — 82962 GLUCOSE BLOOD TEST: CPT

## 2018-02-09 PROCEDURE — 74011250637 HC RX REV CODE- 250/637: Performed by: INTERNAL MEDICINE

## 2018-02-09 PROCEDURE — 97535 SELF CARE MNGMENT TRAINING: CPT | Performed by: OCCUPATIONAL THERAPIST

## 2018-02-09 PROCEDURE — 74011250637 HC RX REV CODE- 250/637: Performed by: FAMILY MEDICINE

## 2018-02-09 RX ORDER — LEVETIRACETAM 500 MG/1
500 TABLET ORAL 2 TIMES DAILY
Status: DISCONTINUED | OUTPATIENT
Start: 2018-02-09 | End: 2018-02-09 | Stop reason: HOSPADM

## 2018-02-09 RX ORDER — DRONABINOL 5 MG/1
5 CAPSULE ORAL 2 TIMES DAILY
Qty: 60 CAP | Refills: 0 | Status: SHIPPED | OUTPATIENT
Start: 2018-02-09 | End: 2018-04-17

## 2018-02-09 RX ORDER — LEVETIRACETAM 500 MG/1
500 TABLET ORAL 2 TIMES DAILY
Qty: 60 TAB | Refills: 0 | Status: SHIPPED | OUTPATIENT
Start: 2018-02-09

## 2018-02-09 RX ADMIN — ASPIRIN 81 MG 81 MG: 81 TABLET ORAL at 10:34

## 2018-02-09 RX ADMIN — LEVETIRACETAM 500 MG: 500 TABLET ORAL at 10:34

## 2018-02-09 RX ADMIN — ATENOLOL 50 MG: 25 TABLET ORAL at 07:23

## 2018-02-09 RX ADMIN — FAMOTIDINE 20 MG: 20 TABLET, FILM COATED ORAL at 10:34

## 2018-02-09 RX ADMIN — Medication 10 ML: at 07:23

## 2018-02-09 NOTE — PROGRESS NOTES
Problem: Falls - Risk of  Goal: *Absence of Falls  Document Dalia Fall Risk and appropriate interventions in the flowsheet.    Outcome: Progressing Towards Goal  Fall Risk Interventions:  Mobility Interventions: Patient to call before getting OOB         Medication Interventions: Evaluate medications/consider consulting pharmacy    Elimination Interventions: Call light in reach    History of Falls Interventions: Bed/chair exit alarm

## 2018-02-09 NOTE — DISCHARGE INSTRUCTIONS
Discharge Instructions       PATIENT ID: Cornelius Sam  MRN: [id number]   YOB: 1935    DATE OF ADMISSION: 2/6/2018 10:21 AM    DATE OF DISCHARGE: 2/9/2018    PRIMARY CARE PROVIDER: Paolo Soto MD     ATTENDING PHYSICIAN: Gissel Jeffers MD  DISCHARGING PROVIDER: Coral Yeung MD    To contact this individual call [phone] and ask the  to page. If unavailable ask to be transferred the Adult Hospitalist Department. DISCHARGE DIAGNOSES syncope     CONSULTATIONS: IP CONSULT TO HOSPITALIST  IP CONSULT TO OTOLARYNGOLOGY  IP CONSULT TO NEUROLOGY  IP CONSULT TO NEUROSURGERY  IP CONSULT TO CARDIOLOGY    PROCEDURES/SURGERIES: * No surgery found *    PENDING TEST RESULTS:   At the time of discharge the following test results are still pending: EEG    FOLLOW UP APPOINTMENTS:   Follow-up Information     Follow up With Details Comments Contact Info    Paolo Soto MD  note empiric keppra for now ( neg EEG) [address] [phone]             ADDITIONAL CARE RECOMMENDATIONS: na    DIET: Resume previous diet     ACTIVITY: Activity as tolerated    WOUND CARE: na    EQUIPMENT needed: na      DISCHARGE MEDICATIONS:   See Medication Reconciliation Form    · It is important that you take the medication exactly as they are prescribed. · Keep your medication in the bottles provided by the pharmacist and keep a list of the medication names, dosages, and times to be taken in your wallet. · Do not take other medications without consulting your doctor. NOTIFY YOUR PHYSICIAN FOR ANY OF THE FOLLOWING:   Fever over 101 degrees for 24 hours. Chest pain, shortness of breath, fever, chills, nausea, vomiting, diarrhea, change in mentation, falling, weakness, bleeding. Severe pain or pain not relieved by medications. Or, any other signs or symptoms that you may have questions about.       DISPOSITION:    Home With:   OT  PT  [place]  RN       SNF/Inpatient Rehab/LTAC Independent/assisted living    Hospice    Other:        Signed:   Coral Yeung MD  2/9/2018  7:50 AM

## 2018-02-09 NOTE — PROGRESS NOTES
Bedside and Verbal shift change report given to Yas (oncoming nurse) by Luis Enrique Farris (offgoing nurse). Report included the following information SBAR, Kardex and MAR.

## 2018-02-09 NOTE — PROGRESS NOTES
Problem: Self Care Deficits Care Plan (Adult)  Goal: *Acute Goals and Plan of Care (Insert Text)  Occupational Therapy Goals  Initiated 2/8/2018    1. Patient will perform bathing seated EOB with supervision/set-up within 7 days. 2.  Patient will perform LB dressing with Trevor within 7 days- MET 2/9/18. 3.  Patient will perform toilet transfer in bathroom with CGA within 7 days. 4.  Patient will perform REJI UE ADL with supervision within 7 days. 5.  Patient will perform ADL standing for 5 minutes with RW support and SBA within 7 days. 6.  Patient will utilize fall prevention techniques during ADL tasks with min cues within 7 days. Occupational Therapy TREATMENT  Patient: Cong Gallego (81 y.o. male)  Date: 2/9/2018  Diagnosis: Syncope Syncope       Precautions: Fall, Seizure  Chart, occupational therapy assessment, plan of care, and goals were reviewed. ASSESSMENT:  Pt making steady progress toward goals. He required an overall min A- CGA level with LE dressing, toileting and functional mobility using RW. He is very [place] even with use of R hearing aide. He remains limited by impaired balance and safety. Recommend HH therapy and 24/7 assist for safety at discharge. Progression toward goals:  []       Improving appropriately and progressing toward goals  [x]       Improving slowly and progressing toward goals  []       Not making progress toward goals and plan of care will be adjusted     PLAN:  Patient continues to benefit from skilled intervention to address the above impairments. Continue treatment per established plan of care. Discharge Recommendations:  Home Health  Further Equipment Recommendations for Discharge:  None in addition to what he owns at home     SUBJECTIVE:   Patient stated I am ok.     OBJECTIVE DATA SUMMARY:   Cognitive/Behavioral Status:  Neurologic State: Alert  Orientation Level: Oriented to person;Oriented to place  Cognition: Decreased attention/concentration; Follows commands ([place])  Perception: Appears intact  Perseveration: No perseveration noted  Safety/Judgement: Awareness of environment;Decreased awareness of need for safety; Fall prevention; Insight into deficits    Functional Mobility and Transfers for ADLs:  Transfers:  Sit to Stand: Contact guard assistance; Additional time;Assist x1  Functional Transfers  Bathroom Mobility: Minimum assistance (amb with walker)  Toilet Transfer : Additional time;Assist x1;Contact guard assistance  Cues: Physical assistance; Tactile cues provided;Verbal cues provided;Visual cues provided (difficulty managing standard walker)  Adaptive Equipment: Walker (comment);Grab bars    Balance:  Sitting: Intact; With support  Standing: Impaired; With support  Standing - Static: Fair  Standing - Dynamic : Fair    ADL Intervention:  Grooming  Grooming Assistance: Contact guard assistance (standing at sink)  Washing Face: Contact guard assistance  Washing Hands: Contact guard assistance  Cues: Tactile cues provided;Verbal cues provided;Visual cues provided    Upper Body Dressing Assistance  Pullover Shirt: Stand-by assistance  Front Opened Shirt: Stand-by assistance  Cues: Verbal cues provided;Visual cues provided    Lower Body Dressing Assistance  Underpants: Contact guard assistance  Pants With Button/Zipper: Contact guard assistance  Leg Crossed Method Used: No  Position Performed: Bending forward method;Seated edge of bed  Cues: Don;Tactile cues provided;Verbal cues provided;Visual cues provided  Adaptive Equipment Used: Walker    Toileting  Toileting Assistance: Contact guard assistance  Bladder Hygiene: Supervision/set-up  Bowel Hygiene: Contact guard assistance  Clothing Management: Contact guard assistance  Cues: Tactile cues provided;Verbal cues provided;Visual cues provided  Adaptive Equipment: Grab bars; Walker    Cognitive Retraining  Safety/Judgement: Awareness of environment;Decreased awareness of need for safety; Fall prevention; Insight into deficits    Pain:  Pain Scale 1: Numeric (0 - 10)  Pain Intensity 1: 0              Activity Tolerance:   Fair  Please refer to the flowsheet for vital signs taken during this treatment.   After treatment:   [] Patient left in no apparent distress sitting up in chair  [x] Patient left in no apparent distress in bed  [x] Call bell left within reach  [x] Nursing notified  [] Caregiver present  [] Bed alarm activated    COMMUNICATION/COLLABORATION:   The patients plan of care was discussed with: Registered Nurse    Frankey Rothman, OT  Time Calculation: 30 mins

## 2018-02-09 NOTE — DISCHARGE SUMMARY
Discharge Summary       PATIENT ID: Fátima Thomas  MRN: [id number]   YOB: 1935    DATE OF ADMISSION: 2/6/2018 10:21 AM    DATE OF DISCHARGE: 2/9/2018    PRIMARY CARE PROVIDER: Morgan Maria MD     ATTENDING PHYSICIAN: Reji Thompson MD   DISCHARGING PROVIDER: Reji Thompson MD    To contact this individual call [phone] and ask the  to page. If unavailable ask to be transferred the Adult Hospitalist Department. CONSULTATIONS: IP CONSULT TO HOSPITALIST  IP CONSULT TO OTOLARYNGOLOGY  IP CONSULT TO NEUROLOGY  IP CONSULT TO NEUROSURGERY  IP CONSULT TO CARDIOLOGY    PROCEDURES/SURGERIES: * No surgery found *    ADMITTING [address] [place] [address] COURSE:   Pt with syncope, prolonged recovery,  TTE with pericardial effusion seen by Card. No interventions indicated. Pt had EEG, results neg, pt discharged on empiric keppra    INTERPRETATION: This is a normal electroencephalogram with the patient   awake and drowsy, showing no clear focal abnormalities or epileptiform   activity. A normal EEG doesn't not rule out seizures. Clinical correlation recommended. Pt else w/o recurrence, no monitor findings while on Tele    Pt had asicites suspect 2n2 seeding or local reaction to prostate cancer,    US liver , contracted GB,   No hepatic lesions    Pt is discharge to f/u with pcp in a timely fashion, and  For post discharge review of EEG pending. Arloa Phi w/u of syncope neg,  And noted neg carotid duplex. Pt seen by neurosurgeon and neurology , nothing of the parotic tumor to parietal area suggest  Surgical issues. ,  This discharging MD felt best for now to start empiric Gonzalez Kos.          Per card: Dr. Robyn Isabel:    Telemetry: sinus with V paced beats  ECG: sinus with V paced beats  Echocardiogram: Abnormal, and reviewed by myself:      Labs:    Recent Results    No results found for this or any previous visit (from the past 24 hour(s)).         Assessment:      Assessment:   Pericardial effusion; asymptomatic and no tamponade on echo  Ascites; he has some effusive condition or inflammatory   S/p pacer   Syncope; this time vasovagal type  Parotid tumor       Plan:   No further evaluation at this time  Will monitor with serial echo as an outpatient on this pericardial effusion             Per neurosurg:    Catarino Escobar MD Physician Signed Neurosurgery Consults Date of Service: 02/06/18 2016     Consult Orders:     IP CONSULT TO NEUROSURGERY [[id number]] ordered by Dharmesh Frazier MD at 02/06/18 1536             []Hide copied text  []Olliever for attribution information  Neurosurgery  Patient of Dr. Juhi Aguayo with multiple recurrences of parotid tumor,   Now with bout of syncope. Another syncopal episode last year. LEFT Upper and LE weakness - (left parotid tumor) has been going on for some time.     Agree with cardiac and neurological workup.     Will discuss with Dr. Juhi Aguayo tomorrow                 ,   Last PN:    Admission Summary:   CHIEF COMPLAINT:  Syncope.      HISTORY OF PRESENT ILLNESS:  The patient is an 80-year-old gentleman with past medical history of parotid tumor, status post radiation as well as three surgeries; hypertension, hypercholesterolemia, stroke, arthritis, GERD and pacemaker placement, who presents to the hospital with the above-mentioned symptoms.  The patient reports that his symptoms happened this morning.  He was eating breakfast when he had a sudden onset of syncope and passed out.  The patient does not remember how long he was passed out, does not recall much details but does report that he did not have any prodromal symptoms; did not have any chest pain, shortness of breath, lightheadedness, dizziness, or any changes in his neurological status.  The patient reports that he has a left-sided parotid tumor that was diagnosed \"many years back. \"  The patient reports that he received multiple doses of radiation and also underwent surgery three times, the last being around 2016, but at that point of time, the full tumor could not be removed.  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  The patient reports that he has noticed that his left side is weaker than usual.  He has left lower extremity and upper extremity weakness that has been going on for many weeks.  The patient also reports that he had some loose stools with one episode yesterday and also had some chills.  The patient denies any other complaints or problems.  Denies any headache, blurry vision, trouble swallowing or trouble with speech; any chest pain, shortness of breath, cough, fever, chills, abdominal pain, constipation, diarrhea, urinary symptoms, focal or generalized neurological weakness, recent travel, sick contacts, falls, injuries or any other concerns or problems besides all those mentioned above in terms of neurological changes.  The patient denies any other complaints or problems     Interval history / Subjective:         2/7:  Daughter/pt relate prolong recovery from syncope; will obtain  EEG, the ECHO pending, the monitor is sinus and sometimes paced. , no murmur on exam ( yield of ECHO would be expected to be low)      2/8:  Discussed paucity of finding with daughter, mentioned the pericardial effusion, will obtain card comment and look at liver as has ascitics ; daughter recognized that there is probably no real treatment option available for pt's myriad medical  Issues,, anticipate dc home in am 2/9      Note: work up of 2 conditions instituted with consult and US as per below.       Assessment & Plan:      Syncope, suspect simple but as prolonged arousal, will obtain EEG.  - neg, carotid dopple neg.      Pericardial effusion, suspect chronic and not hemodynamically signifincant,  For US liver,  ( ascites) and card to comment, , US liver neg,  Card will follow serial echo's as an out pt     Prostate cancer, stage 4, no treatment planned.      Ascities , suspect chronic and 2n2 prostate cancer, other, will obtain ultrasound liver,  Neg us except for contracted GB, neg abd exam , no abd pain, no n/v/     Parotic tumor, note and not susrgical not felt to contribute to recurrent syncope,   Seen by neurosurg. No surgical issues. Code status: full   DVT prophylaxis: SCD     Care Plan discussed with: Patient/Family and Nurse  Disposition: Home w/Family and TBD      Hospital Problems  Date Reviewed: 2/6/2018             Codes Class Noted POA     * (Principal)Syncope ICD-10-CM: R55  ICD-9-CM: 877. 2   2/6/2018 Unknown                     Review of Systems:   A comprehensive review of systems was negative except for that written in the HPI.         Vital Signs:    Last 24hrs VS reviewed since prior progress note. Most recent are:       Visit Vitals    /56    Pulse 61    Temp 98.5 °F (36.9 °C)    Resp 14    Ht 5' 4.96\" (1.65 m)    Wt 61.7 kg (136 lb 0.4 oz)    SpO2 98%    BMI 22.66 kg/m2            Intake/Output Summary (Last 24 hours) at 02/08/18 0920  Last data filed at 02/07/18 1630    Gross per 24 hour   Intake                0 ml   Output              225 ml   Net             -225 ml         Physical Examination:             Constitutional:  No acute distress, cooperative, pleasant    ENT:  Oral mucous moist, oropharynx benign. Neck supple,    Resp:  CTA bilaterally. No wheezing/rhonchi/rales. No accessory muscle use   CV:  Regular rhythm, normal rate, no murmurs, gallops, rubs    GI:  Soft, non distended, non tender. normoactive bowel sounds, no hepatosplenomegaly     Musculoskeletal:  No edema, warm, 2+ pulses throughout    Neurologic:  Moves all extremities. AAOx3, CN II-XII reviewed                                             Psych:  Good insight, Not anxious nor agitated.   Skin:  Good turgor, no rashes or ulcers         Data Review:    Review and/or order of clinical lab test        Labs:           Recent Labs       02/07/18   0729  02/06/18   1032 WBC  2.3*  2.8*   HGB  7.5*  9.4*   HCT  22.7*  29.0*   PLT  77*  100*          Recent Labs       02/06/18   1032   NA  138   K  4.2   CL  103   CO2  24   BUN  24*   CREA  1.30   GLU  121*   CA  9.4          Recent Labs       02/06/18   1032   SGOT  38*   ALT  14   AP  435*   TBILI  0.4   TP  7.4   ALB  3.1*   GLOB  4.3*      No results for input(s): INR, PTP, APTT in the last 72 hours.     No lab exists for component: INREXT, INREXT   No results for input(s): FE, TIBC, PSAT, FERR in the last 72 hours. Lab Results   Component Value Date/Time     Folate 26.4 (H) 02/06/2018 10:32 AM      No results for input(s): PH, PCO2, PO2 in the last 72 hours.        Recent Labs       02/06/18   1659  02/06/18   1032   TROIQ  <0.04  <0.04            Lab Results   Component Value Date/Time     Cholesterol, total 114 02/07/2018 04:34 AM     HDL Cholesterol 38 02/07/2018 04:34 AM     LDL, calculated 59 02/07/2018 04:34 AM     Triglyceride 85 02/07/2018 04:34 AM     CHOL/HDL Ratio 3.0 02/07/2018 04:34 AM            Lab Results   Component Value Date/Time     Glucose (POC) 116 (H) 11/07/2016 12:09 PM     Glucose (POC) 99 11/07/2016 06:22 AM     Glucose (POC) 114 (H) 11/06/2016 11:38 PM     Glucose (POC) 149 (H) 11/06/2016 06:14 PM     Glucose (POC) 180 (H) 11/06/2016 11:47 AM            Lab Results   Component Value Date/Time     Color YELLOW/STRAW 02/06/2018 01:09 PM     Appearance CLEAR 02/06/2018 01:09 PM     Specific gravity 1.023 02/06/2018 01:09 PM     pH (UA) 5.0 02/06/2018 01:09 PM     Protein 30 (A) 02/06/2018 01:09 PM     Glucose NEGATIVE  02/06/2018 01:09 PM     Ketone NEGATIVE  02/06/2018 01:09 PM     Bilirubin NEGATIVE  02/06/2018 01:09 PM     Urobilinogen 1.0 02/06/2018 01:09 PM     Nitrites NEGATIVE  02/06/2018 01:09 PM     Leukocyte Esterase NEGATIVE  02/06/2018 01:09 PM     Epithelial cells FEW 02/06/2018 01:09 PM     Bacteria NEGATIVE  02/06/2018 01:09 PM     WBC 0-4 02/06/2018 01:09 PM     RBC 0-5 02/06/2018 01:09 PM            Medications Reviewed:             Current Facility-Administered Medications   Medication Dose Route Frequency    famotidine (PEPCID) tablet 20 mg  20 mg Oral DAILY    atenolol (TENORMIN) tablet 50 mg  50 mg Oral 7am    sodium chloride (NS) flush 5-10 mL  5-10 mL IntraVENous Q8H    sodium chloride (NS) flush 5-10 mL  5-10 mL IntraVENous PRN    acetaminophen (TYLENOL) tablet 650 mg  650 mg Oral Q4H PRN     Or    acetaminophen (TYLENOL) solution 650 mg  650 mg Per NG tube Q4H PRN     Or    acetaminophen (TYLENOL) suppository 650 mg  650 mg Rectal Q4H PRN    aspirin chewable tablet 81 mg  81 mg Oral DAILY    lovastatin (MEVACOR) tablet 40 mg  40 mg Oral QHS                DISCHARGE DIAGNOSES / PLAN:      1.  as above        PENDING TEST RESULTS:   At the time of discharge the following test results are still pending: na    FOLLOW UP APPOINTMENTS:    Follow-up Information     Follow up With Details Comments Contact Info    Fabiana Spaulding MD  note empiric keppra for now ( neg EEG) [address] [address] [phone]             ADDITIONAL CARE RECOMMENDATIONS: na    DIET: Resume previous diet     ACTIVITY: Activity as tolerated    WOUND CARE: na    EQUIPMENT needed: na      DISCHARGE MEDICATIONS:  Current Discharge Medication List      START taking these medications    Details   levETIRAcetam (KEPPRA) 500 mg tablet Take 1 Tab by mouth two (2) times a day. Qty: 60 Tab, Refills: 0      dronabinol (MARINOL) 5 mg capsule Take 1 Cap by mouth two (2) times a day. Max Daily Amount: 10 mg.  Qty: 60 Cap, Refills: 0    Associated Diagnoses: Low weight         CONTINUE these medications which have NOT CHANGED    Details   aspirin delayed-release 81 mg tablet Take 1 Tab by mouth daily. Qty: 30 Tab, Refills: 0      HYDROcodone-acetaminophen (NORCO) 5-325 mg per tablet Take 1 Tab by mouth every four (4) hours as needed. Max Daily Amount: 6 Tabs.   Qty: 32 Tab, Refills: 0      atenolol (TENORMIN) 50 mg tablet Take 50 mg by mouth every morning. NOTIFY YOUR PHYSICIAN FOR ANY OF THE FOLLOWING:   Fever over 101 degrees for 24 hours. Chest pain, shortness of breath, fever, chills, nausea, vomiting, diarrhea, change in mentation, falling, weakness, bleeding. Severe pain or pain not relieved by medications. Or, any other signs or symptoms that you may have questions about.     DISPOSITION:    Home With:   OT  PT  HH  RN       Long term SNF/Inpatient Rehab   x Independent/assisted living    Hospice    Other:       PATIENT CONDITION AT DISCHARGE:     Functional status    Poor     Deconditioned    x Independent      Cognition   x  Lucid     Forgetful     Dementia      Catheters/lines (plus indication)    [place]     PICC     PEG    x None      Code status    x Full code      DNR      PHYSICAL EXAMINATION AT DISCHARGE:   Refer to Progress Note    Pul: clear  CV: rr no gallop  Abd; soft non tender  Ext: supple no significant edema  Visit Vitals    /59 (BP 1 Location: Right arm, BP Patient Position: Sitting)    Pulse 68    Temp 98.2 °F (36.8 °C)    Resp 16    Ht 5' 4.96\" (1.65 m)    Wt 61.7 kg (136 lb 0.4 oz)    SpO2 100%    BMI 22.66 kg/m2          CHRONIC MEDICAL DIAGNOSES:  Problem List as of 2/9/2018  Date Reviewed: 2/9/2018          Codes Class Noted - Resolved    Low weight ICD-10-CM: R63.6  ICD-9-CM: 783.22  2/9/2018 - Present        * (Principal)Syncope ICD-10-CM: R55  ICD-9-CM: 780.2  2/6/2018 - Present        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

## 2018-02-09 NOTE — PROGRESS NOTES
Bedside shift change report given to Loly Andrade RN (oncoming nurse) by Yunier Olsen RN (offgoing nurse). Report included the following information SBAR, Kardex and MAR.

## 2018-02-09 NOTE — PROGRESS NOTES
Bedside shift change report given to Dayton Phelps RN (oncoming nurse) by Annie Parker RN (offgoing nurse). Report included the following information SBAR, Kardex and MAR.

## 2018-02-11 NOTE — PROGRESS NOTES
Brief:    Additional dx of record:    Compression of brain in the setting of parotid gland tumor treated with head CT, neuro checks and neuro surg consult,     Bhavin Bagley MD 2/11/2018

## 2018-04-16 ENCOUNTER — APPOINTMENT (OUTPATIENT)
Dept: GENERAL RADIOLOGY | Age: 83
DRG: 683 | End: 2018-04-16
Attending: EMERGENCY MEDICINE
Payer: MEDICARE

## 2018-04-16 PROCEDURE — 93005 ELECTROCARDIOGRAM TRACING: CPT

## 2018-04-16 PROCEDURE — 77030013169 SET IV BLD ICUM -A

## 2018-04-16 PROCEDURE — 85730 THROMBOPLASTIN TIME PARTIAL: CPT | Performed by: EMERGENCY MEDICINE

## 2018-04-16 PROCEDURE — 85025 COMPLETE CBC W/AUTO DIFF WBC: CPT | Performed by: EMERGENCY MEDICINE

## 2018-04-16 PROCEDURE — 84100 ASSAY OF PHOSPHORUS: CPT | Performed by: EMERGENCY MEDICINE

## 2018-04-16 PROCEDURE — 85610 PROTHROMBIN TIME: CPT | Performed by: EMERGENCY MEDICINE

## 2018-04-16 PROCEDURE — 77030029684 HC NEB SM VOL KT MONA -A

## 2018-04-16 PROCEDURE — 36415 COLL VENOUS BLD VENIPUNCTURE: CPT | Performed by: EMERGENCY MEDICINE

## 2018-04-16 PROCEDURE — 85379 FIBRIN DEGRADATION QUANT: CPT | Performed by: EMERGENCY MEDICINE

## 2018-04-16 PROCEDURE — 83735 ASSAY OF MAGNESIUM: CPT | Performed by: EMERGENCY MEDICINE

## 2018-04-16 PROCEDURE — 82150 ASSAY OF AMYLASE: CPT | Performed by: EMERGENCY MEDICINE

## 2018-04-16 PROCEDURE — 80053 COMPREHEN METABOLIC PANEL: CPT | Performed by: EMERGENCY MEDICINE

## 2018-04-16 PROCEDURE — 84484 ASSAY OF TROPONIN QUANT: CPT | Performed by: EMERGENCY MEDICINE

## 2018-04-16 PROCEDURE — 83690 ASSAY OF LIPASE: CPT | Performed by: EMERGENCY MEDICINE

## 2018-04-17 ENCOUNTER — APPOINTMENT (OUTPATIENT)
Dept: ULTRASOUND IMAGING | Age: 83
DRG: 683 | End: 2018-04-17
Attending: INTERNAL MEDICINE
Payer: MEDICARE

## 2018-04-17 ENCOUNTER — APPOINTMENT (OUTPATIENT)
Dept: NUCLEAR MEDICINE | Age: 83
DRG: 683 | End: 2018-04-17
Attending: INTERNAL MEDICINE
Payer: MEDICARE

## 2018-04-17 ENCOUNTER — APPOINTMENT (OUTPATIENT)
Dept: GENERAL RADIOLOGY | Age: 83
DRG: 683 | End: 2018-04-17
Attending: EMERGENCY MEDICINE
Payer: MEDICARE

## 2018-04-17 ENCOUNTER — APPOINTMENT (OUTPATIENT)
Dept: CT IMAGING | Age: 83
DRG: 683 | End: 2018-04-17
Attending: EMERGENCY MEDICINE
Payer: MEDICARE

## 2018-04-17 ENCOUNTER — HOSPITAL ENCOUNTER (INPATIENT)
Age: 83
LOS: 3 days | Discharge: HOSPICE | DRG: 683 | End: 2018-04-20
Attending: EMERGENCY MEDICINE | Admitting: INTERNAL MEDICINE
Payer: MEDICARE

## 2018-04-17 DIAGNOSIS — R53.81 DEBILITY: ICD-10-CM

## 2018-04-17 DIAGNOSIS — D64.9 ANEMIA, UNSPECIFIED TYPE: Primary | ICD-10-CM

## 2018-04-17 DIAGNOSIS — N17.9 ACUTE RENAL FAILURE, UNSPECIFIED ACUTE RENAL FAILURE TYPE (HCC): ICD-10-CM

## 2018-04-17 DIAGNOSIS — C79.51 BONY METASTASIS (HCC): ICD-10-CM

## 2018-04-17 DIAGNOSIS — E87.5 ACUTE HYPERKALEMIA: ICD-10-CM

## 2018-04-17 DIAGNOSIS — Z71.89 GOALS OF CARE, COUNSELING/DISCUSSION: ICD-10-CM

## 2018-04-17 DIAGNOSIS — J90 PLEURAL EFFUSION: ICD-10-CM

## 2018-04-17 DIAGNOSIS — R53.83 FATIGUE, UNSPECIFIED TYPE: ICD-10-CM

## 2018-04-17 DIAGNOSIS — Z66 DO NOT RESUSCITATE: ICD-10-CM

## 2018-04-17 DIAGNOSIS — N04.9 ANASARCA ASSOCIATED WITH DISORDER OF KIDNEY: ICD-10-CM

## 2018-04-17 LAB
ALBUMIN SERPL-MCNC: 2.2 G/DL (ref 3.5–5)
ALBUMIN SERPL-MCNC: 2.4 G/DL (ref 3.5–5)
ALBUMIN/GLOB SERPL: 0.7 {RATIO} (ref 1.1–2.2)
ALBUMIN/GLOB SERPL: 0.7 {RATIO} (ref 1.1–2.2)
ALP SERPL-CCNC: 358 U/L (ref 45–117)
ALP SERPL-CCNC: 381 U/L (ref 45–117)
ALT SERPL-CCNC: 8 U/L (ref 12–78)
ALT SERPL-CCNC: 8 U/L (ref 12–78)
AMYLASE SERPL-CCNC: 45 U/L (ref 25–115)
ANION GAP BLD CALC-SCNC: 14 MMOL/L (ref 10–20)
ANION GAP SERPL CALC-SCNC: 10 MMOL/L (ref 5–15)
ANION GAP SERPL CALC-SCNC: 11 MMOL/L (ref 5–15)
ANION GAP SERPL CALC-SCNC: 11 MMOL/L (ref 5–15)
ANION GAP SERPL CALC-SCNC: 12 MMOL/L (ref 5–15)
APPEARANCE UR: CLEAR
APTT PPP: 23.1 SEC (ref 22.1–32)
AST SERPL-CCNC: 15 U/L (ref 15–37)
AST SERPL-CCNC: 15 U/L (ref 15–37)
ATRIAL RATE: 66 BPM
BACTERIA URNS QL MICRO: NEGATIVE /HPF
BASOPHILS # BLD: 0 K/UL (ref 0–0.1)
BASOPHILS # BLD: 0 K/UL (ref 0–0.1)
BASOPHILS NFR BLD: 0 % (ref 0–1)
BASOPHILS NFR BLD: 1 % (ref 0–1)
BILIRUB SERPL-MCNC: 0.4 MG/DL (ref 0.2–1)
BILIRUB SERPL-MCNC: 0.4 MG/DL (ref 0.2–1)
BILIRUB UR QL: NEGATIVE
BLOOD GROUP ANTIBODIES SERPL: NORMAL
BUN BLD-MCNC: 80 MG/DL (ref 9–20)
BUN SERPL-MCNC: 74 MG/DL (ref 6–20)
BUN SERPL-MCNC: 76 MG/DL (ref 6–20)
BUN/CREAT SERPL: 8 (ref 12–20)
BUN/CREAT SERPL: 9 (ref 12–20)
CA-I BLD-MCNC: 1.09 MMOL/L (ref 1.12–1.32)
CALCIUM SERPL-MCNC: 8.5 MG/DL (ref 8.5–10.1)
CALCIUM SERPL-MCNC: 8.6 MG/DL (ref 8.5–10.1)
CALCIUM SERPL-MCNC: 8.7 MG/DL (ref 8.5–10.1)
CALCIUM SERPL-MCNC: 8.7 MG/DL (ref 8.5–10.1)
CALCULATED P AXIS, ECG09: 42 DEGREES
CALCULATED R AXIS, ECG10: 15 DEGREES
CALCULATED T AXIS, ECG11: 40 DEGREES
CHLORIDE BLD-SCNC: 108 MMOL/L (ref 98–107)
CHLORIDE SERPL-SCNC: 106 MMOL/L (ref 97–108)
CHLORIDE SERPL-SCNC: 109 MMOL/L (ref 97–108)
CHLORIDE SERPL-SCNC: 109 MMOL/L (ref 97–108)
CHLORIDE SERPL-SCNC: 111 MMOL/L (ref 97–108)
CHOLEST SERPL-MCNC: 112 MG/DL
CK MB CFR SERPL CALC: 3.5 % (ref 0–2.5)
CK MB SERPL-MCNC: 1.8 NG/ML (ref 5–25)
CK SERPL-CCNC: 51 U/L (ref 39–308)
CO2 BLD-SCNC: 21 MMOL/L (ref 21–32)
CO2 SERPL-SCNC: 19 MMOL/L (ref 21–32)
CO2 SERPL-SCNC: 20 MMOL/L (ref 21–32)
CO2 SERPL-SCNC: 20 MMOL/L (ref 21–32)
CO2 SERPL-SCNC: 21 MMOL/L (ref 21–32)
COLOR UR: ABNORMAL
CREAT BLD-MCNC: 8.7 MG/DL (ref 0.6–1.3)
CREAT SERPL-MCNC: 8.51 MG/DL (ref 0.7–1.3)
CREAT SERPL-MCNC: 8.6 MG/DL (ref 0.7–1.3)
CREAT SERPL-MCNC: 8.64 MG/DL (ref 0.7–1.3)
CREAT SERPL-MCNC: 8.75 MG/DL (ref 0.7–1.3)
D DIMER PPP FEU-MCNC: 4.83 MG/L FEU (ref 0–0.65)
DAT POLY-SP REAG RBC QL: NORMAL
DIAGNOSIS, 93000: NORMAL
DIFFERENTIAL METHOD BLD: ABNORMAL
DIFFERENTIAL METHOD BLD: ABNORMAL
EOSINOPHIL # BLD: 0 K/UL (ref 0–0.4)
EOSINOPHIL # BLD: 0.1 K/UL (ref 0–0.4)
EOSINOPHIL NFR BLD: 1 % (ref 0–7)
EOSINOPHIL NFR BLD: 2 % (ref 0–7)
EPITH CASTS URNS QL MICRO: ABNORMAL /LPF
ERYTHROCYTE [DISTWIDTH] IN BLOOD BY AUTOMATED COUNT: 18.6 % (ref 11.5–14.5)
ERYTHROCYTE [DISTWIDTH] IN BLOOD BY AUTOMATED COUNT: 20.1 % (ref 11.5–14.5)
FERRITIN SERPL-MCNC: 1044 NG/ML (ref 26–388)
FOLATE SERPL-MCNC: 14.5 NG/ML (ref 5–21)
GLOBULIN SER CALC-MCNC: 3.3 G/DL (ref 2–4)
GLOBULIN SER CALC-MCNC: 3.4 G/DL (ref 2–4)
GLUCOSE BLD-MCNC: 88 MG/DL (ref 65–100)
GLUCOSE SERPL-MCNC: 82 MG/DL (ref 65–100)
GLUCOSE SERPL-MCNC: 83 MG/DL (ref 65–100)
GLUCOSE SERPL-MCNC: 94 MG/DL (ref 65–100)
GLUCOSE SERPL-MCNC: 96 MG/DL (ref 65–100)
GLUCOSE UR STRIP.AUTO-MCNC: NEGATIVE MG/DL
HAPTOGLOB SERPL-MCNC: 275 MG/DL (ref 30–200)
HCT VFR BLD AUTO: 21.1 % (ref 36.6–50.3)
HCT VFR BLD AUTO: 24.8 % (ref 36.6–50.3)
HCT VFR BLD CALC: 18 % (ref 36.6–50.3)
HDLC SERPL-MCNC: 39 MG/DL
HDLC SERPL: 2.9 {RATIO} (ref 0–5)
HEMOCCULT STL QL: POSITIVE
HGB BLD-MCNC: 6.5 G/DL (ref 12.1–17)
HGB BLD-MCNC: 7.8 G/DL (ref 12.1–17)
HGB UR QL STRIP: NEGATIVE
IMM GRANULOCYTES # BLD: 0 K/UL
IMM GRANULOCYTES # BLD: 0.1 K/UL
IMM GRANULOCYTES NFR BLD AUTO: 0 %
IMM GRANULOCYTES NFR BLD AUTO: 2 %
INR PPP: 1.1 (ref 0.9–1.1)
IRON SATN MFR SERPL: 50 % (ref 20–50)
IRON SERPL-MCNC: 87 UG/DL (ref 35–150)
KETONES UR QL STRIP.AUTO: NEGATIVE MG/DL
LDH SERPL L TO P-CCNC: 312 U/L (ref 85–241)
LDLC SERPL CALC-MCNC: 47.8 MG/DL (ref 0–100)
LEUKOCYTE ESTERASE UR QL STRIP.AUTO: NEGATIVE
LIPASE SERPL-CCNC: 181 U/L (ref 73–393)
LIPID PROFILE,FLP: NORMAL
LYMPHOCYTES # BLD: 0.9 K/UL (ref 0.8–3.5)
LYMPHOCYTES # BLD: 1.1 K/UL (ref 0.8–3.5)
LYMPHOCYTES NFR BLD: 34 % (ref 12–49)
LYMPHOCYTES NFR BLD: 34 % (ref 12–49)
MAGNESIUM SERPL-MCNC: 2 MG/DL (ref 1.6–2.4)
MCH RBC QN AUTO: 31.5 PG (ref 26–34)
MCH RBC QN AUTO: 32 PG (ref 26–34)
MCHC RBC AUTO-ENTMCNC: 30.8 G/DL (ref 30–36.5)
MCHC RBC AUTO-ENTMCNC: 31.5 G/DL (ref 30–36.5)
MCV RBC AUTO: 100 FL (ref 80–99)
MCV RBC AUTO: 103.9 FL (ref 80–99)
METAMYELOCYTES NFR BLD MANUAL: 1 %
METAMYELOCYTES NFR BLD MANUAL: 2 %
MONOCYTES # BLD: 0.3 K/UL (ref 0–1)
MONOCYTES # BLD: 0.5 K/UL (ref 0–1)
MONOCYTES NFR BLD: 12 % (ref 5–13)
MONOCYTES NFR BLD: 17 % (ref 5–13)
MYELOCYTES NFR BLD MANUAL: 1 %
MYELOCYTES NFR BLD MANUAL: 3 %
NEUTS BAND NFR BLD MANUAL: 2 % (ref 0–6)
NEUTS BAND NFR BLD MANUAL: 2 % (ref 0–6)
NEUTS SEG # BLD: 1.2 K/UL (ref 1.8–8)
NEUTS SEG # BLD: 1.3 K/UL (ref 1.8–8)
NEUTS SEG NFR BLD: 41 % (ref 32–75)
NEUTS SEG NFR BLD: 45 % (ref 32–75)
NITRITE UR QL STRIP.AUTO: NEGATIVE
NRBC # BLD: 0.27 K/UL (ref 0–0.01)
NRBC # BLD: 0.4 K/UL (ref 0–0.01)
NRBC BLD-RTO: 10.5 PER 100 WBC
NRBC BLD-RTO: 12.8 PER 100 WBC
P-R INTERVAL, ECG05: 114 MS
PH UR STRIP: 6.5 [PH]
PHOSPHATE SERPL-MCNC: 5 MG/DL (ref 2.6–4.7)
PLATELET # BLD AUTO: 110 K/UL (ref 150–400)
PLATELET # BLD AUTO: 98 K/UL (ref 150–400)
PMV BLD AUTO: 10.1 FL (ref 8.9–12.9)
PMV BLD AUTO: 9.9 FL (ref 8.9–12.9)
POTASSIUM BLD-SCNC: 6.9 MMOL/L (ref 3.5–5.1)
POTASSIUM SERPL-SCNC: 5.8 MMOL/L (ref 3.5–5.1)
POTASSIUM SERPL-SCNC: 5.9 MMOL/L (ref 3.5–5.1)
POTASSIUM SERPL-SCNC: 6.1 MMOL/L (ref 3.5–5.1)
POTASSIUM SERPL-SCNC: 6.8 MMOL/L (ref 3.5–5.1)
PROT SERPL-MCNC: 5.5 G/DL (ref 6.4–8.2)
PROT SERPL-MCNC: 5.8 G/DL (ref 6.4–8.2)
PROT UR STRIP-MCNC: NEGATIVE MG/DL
PROTHROMBIN TIME: 11.5 SEC (ref 9–11.1)
Q-T INTERVAL, ECG07: 406 MS
QRS DURATION, ECG06: 86 MS
QTC CALCULATION (BEZET), ECG08: 425 MS
RBC # BLD AUTO: 2.03 M/UL (ref 4.1–5.7)
RBC # BLD AUTO: 2.48 M/UL (ref 4.1–5.7)
RBC #/AREA URNS HPF: ABNORMAL /HPF (ref 0–5)
RBC MORPH BLD: ABNORMAL
RETICS # AUTO: 0.06 M/UL (ref 0.03–0.1)
RETICS/RBC NFR AUTO: 2.6 % (ref 0.7–2.1)
SERVICE CMNT-IMP: ABNORMAL
SODIUM BLD-SCNC: 134 MMOL/L (ref 136–145)
SODIUM SERPL-SCNC: 137 MMOL/L (ref 136–145)
SODIUM SERPL-SCNC: 140 MMOL/L (ref 136–145)
SODIUM SERPL-SCNC: 141 MMOL/L (ref 136–145)
SODIUM SERPL-SCNC: 141 MMOL/L (ref 136–145)
SP GR UR REFRACTOMETRY: 1.01
THERAPEUTIC RANGE,PTTT: NORMAL SECS (ref 58–77)
TIBC SERPL-MCNC: 175 UG/DL (ref 250–450)
TRIGL SERPL-MCNC: 126 MG/DL (ref ?–150)
TROPONIN I SERPL-MCNC: <0.04 NG/ML
TROPONIN I SERPL-MCNC: <0.04 NG/ML
TSH SERPL DL<=0.05 MIU/L-ACNC: 1.63 UIU/ML (ref 0.36–3.74)
TSH SERPL DL<=0.05 MIU/L-ACNC: 1.93 UIU/ML (ref 0.36–3.74)
UA: UC IF INDICATED,UAUC: ABNORMAL
UROBILINOGEN UR QL STRIP.AUTO: 0.2 EU/DL
VENTRICULAR RATE, ECG03: 66 BPM
VIT B12 SERPL-MCNC: 981 PG/ML (ref 193–986)
VLDLC SERPL CALC-MCNC: 25.2 MG/DL
WBC # BLD AUTO: 2.6 K/UL (ref 4.1–11.1)
WBC # BLD AUTO: 3.1 K/UL (ref 4.1–11.1)
WBC URNS QL MICRO: ABNORMAL /HPF (ref 0–4)

## 2018-04-17 PROCEDURE — 77030034850

## 2018-04-17 PROCEDURE — 74011250636 HC RX REV CODE- 250/636: Performed by: EMERGENCY MEDICINE

## 2018-04-17 PROCEDURE — 82746 ASSAY OF FOLIC ACID SERUM: CPT | Performed by: INTERNAL MEDICINE

## 2018-04-17 PROCEDURE — 81001 URINALYSIS AUTO W/SCOPE: CPT | Performed by: EMERGENCY MEDICINE

## 2018-04-17 PROCEDURE — C9113 INJ PANTOPRAZOLE SODIUM, VIA: HCPCS | Performed by: EMERGENCY MEDICINE

## 2018-04-17 PROCEDURE — 80047 BASIC METABLC PNL IONIZED CA: CPT

## 2018-04-17 PROCEDURE — 80053 COMPREHEN METABOLIC PANEL: CPT | Performed by: INTERNAL MEDICINE

## 2018-04-17 PROCEDURE — 76770 US EXAM ABDO BACK WALL COMP: CPT

## 2018-04-17 PROCEDURE — 96375 TX/PRO/DX INJ NEW DRUG ADDON: CPT

## 2018-04-17 PROCEDURE — 84443 ASSAY THYROID STIM HORMONE: CPT | Performed by: INTERNAL MEDICINE

## 2018-04-17 PROCEDURE — 85045 AUTOMATED RETICULOCYTE COUNT: CPT | Performed by: INTERNAL MEDICINE

## 2018-04-17 PROCEDURE — 82728 ASSAY OF FERRITIN: CPT | Performed by: INTERNAL MEDICINE

## 2018-04-17 PROCEDURE — 80048 BASIC METABOLIC PNL TOTAL CA: CPT

## 2018-04-17 PROCEDURE — 74011250636 HC RX REV CODE- 250/636: Performed by: INTERNAL MEDICINE

## 2018-04-17 PROCEDURE — 86880 COOMBS TEST DIRECT: CPT | Performed by: INTERNAL MEDICINE

## 2018-04-17 PROCEDURE — 87086 URINE CULTURE/COLONY COUNT: CPT | Performed by: EMERGENCY MEDICINE

## 2018-04-17 PROCEDURE — 83540 ASSAY OF IRON: CPT | Performed by: INTERNAL MEDICINE

## 2018-04-17 PROCEDURE — 85025 COMPLETE CBC W/AUTO DIFF WBC: CPT | Performed by: INTERNAL MEDICINE

## 2018-04-17 PROCEDURE — 36430 TRANSFUSION BLD/BLD COMPNT: CPT

## 2018-04-17 PROCEDURE — 94761 N-INVAS EAR/PLS OXIMETRY MLT: CPT

## 2018-04-17 PROCEDURE — 74011000250 HC RX REV CODE- 250: Performed by: INTERNAL MEDICINE

## 2018-04-17 PROCEDURE — C9113 INJ PANTOPRAZOLE SODIUM, VIA: HCPCS | Performed by: INTERNAL MEDICINE

## 2018-04-17 PROCEDURE — 74011250637 HC RX REV CODE- 250/637: Performed by: HOSPITALIST

## 2018-04-17 PROCEDURE — 83883 ASSAY NEPHELOMETRY NOT SPEC: CPT | Performed by: INTERNAL MEDICINE

## 2018-04-17 PROCEDURE — 86900 BLOOD TYPING SEROLOGIC ABO: CPT | Performed by: EMERGENCY MEDICINE

## 2018-04-17 PROCEDURE — 80061 LIPID PANEL: CPT | Performed by: INTERNAL MEDICINE

## 2018-04-17 PROCEDURE — 51702 INSERT TEMP BLADDER CATH: CPT

## 2018-04-17 PROCEDURE — 71045 X-RAY EXAM CHEST 1 VIEW: CPT

## 2018-04-17 PROCEDURE — 83615 LACTATE (LD) (LDH) ENZYME: CPT | Performed by: INTERNAL MEDICINE

## 2018-04-17 PROCEDURE — 74011636637 HC RX REV CODE- 636/637: Performed by: EMERGENCY MEDICINE

## 2018-04-17 PROCEDURE — 84484 ASSAY OF TROPONIN QUANT: CPT | Performed by: INTERNAL MEDICINE

## 2018-04-17 PROCEDURE — 74011250637 HC RX REV CODE- 250/637: Performed by: INTERNAL MEDICINE

## 2018-04-17 PROCEDURE — 74176 CT ABD & PELVIS W/O CONTRAST: CPT

## 2018-04-17 PROCEDURE — 30233N1 TRANSFUSION OF NONAUTOLOGOUS RED BLOOD CELLS INTO PERIPHERAL VEIN, PERCUTANEOUS APPROACH: ICD-10-PCS | Performed by: FAMILY MEDICINE

## 2018-04-17 PROCEDURE — 82607 VITAMIN B-12: CPT | Performed by: INTERNAL MEDICINE

## 2018-04-17 PROCEDURE — 74011000258 HC RX REV CODE- 258: Performed by: INTERNAL MEDICINE

## 2018-04-17 PROCEDURE — 93970 EXTREMITY STUDY: CPT

## 2018-04-17 PROCEDURE — 96374 THER/PROPH/DIAG INJ IV PUSH: CPT

## 2018-04-17 PROCEDURE — 82550 ASSAY OF CK (CPK): CPT | Performed by: INTERNAL MEDICINE

## 2018-04-17 PROCEDURE — 65660000001 HC RM ICU INTERMED STEPDOWN

## 2018-04-17 PROCEDURE — 99285 EMERGENCY DEPT VISIT HI MDM: CPT

## 2018-04-17 PROCEDURE — 83010 ASSAY OF HAPTOGLOBIN QUANT: CPT | Performed by: INTERNAL MEDICINE

## 2018-04-17 PROCEDURE — 86923 COMPATIBILITY TEST ELECTRIC: CPT | Performed by: EMERGENCY MEDICINE

## 2018-04-17 PROCEDURE — 74011250637 HC RX REV CODE- 250/637: Performed by: EMERGENCY MEDICINE

## 2018-04-17 PROCEDURE — P9016 RBC LEUKOCYTES REDUCED: HCPCS | Performed by: EMERGENCY MEDICINE

## 2018-04-17 PROCEDURE — 84165 PROTEIN E-PHORESIS SERUM: CPT | Performed by: INTERNAL MEDICINE

## 2018-04-17 PROCEDURE — 80177 DRUG SCRN QUAN LEVETIRACETAM: CPT | Performed by: INTERNAL MEDICINE

## 2018-04-17 PROCEDURE — A9540 TC99M MAA: HCPCS

## 2018-04-17 PROCEDURE — 74011000250 HC RX REV CODE- 250: Performed by: EMERGENCY MEDICINE

## 2018-04-17 PROCEDURE — 94640 AIRWAY INHALATION TREATMENT: CPT

## 2018-04-17 PROCEDURE — 82272 OCCULT BLD FECES 1-3 TESTS: CPT | Performed by: EMERGENCY MEDICINE

## 2018-04-17 RX ORDER — SODIUM CHLORIDE 9 MG/ML
250 INJECTION, SOLUTION INTRAVENOUS AS NEEDED
Status: DISCONTINUED | OUTPATIENT
Start: 2018-04-17 | End: 2018-04-20 | Stop reason: HOSPADM

## 2018-04-17 RX ORDER — CALCIUM ACETATE 667 MG/1
1 TABLET ORAL
Status: DISCONTINUED | OUTPATIENT
Start: 2018-04-17 | End: 2018-04-20 | Stop reason: HOSPADM

## 2018-04-17 RX ORDER — FUROSEMIDE 10 MG/ML
60 INJECTION INTRAMUSCULAR; INTRAVENOUS ONCE
Status: COMPLETED | OUTPATIENT
Start: 2018-04-17 | End: 2018-04-17

## 2018-04-17 RX ORDER — ATENOLOL 25 MG/1
25 TABLET ORAL DAILY
COMMUNITY

## 2018-04-17 RX ORDER — ATENOLOL 25 MG/1
25 TABLET ORAL
Status: DISCONTINUED | OUTPATIENT
Start: 2018-04-17 | End: 2018-04-20 | Stop reason: HOSPADM

## 2018-04-17 RX ORDER — ALBUTEROL SULFATE 0.83 MG/ML
5 SOLUTION RESPIRATORY (INHALATION) ONCE
Status: COMPLETED | OUTPATIENT
Start: 2018-04-17 | End: 2018-04-17

## 2018-04-17 RX ORDER — DEXTROSE 50 % IN WATER (D50W) INTRAVENOUS SYRINGE
25
Status: COMPLETED | OUTPATIENT
Start: 2018-04-17 | End: 2018-04-17

## 2018-04-17 RX ORDER — SODIUM CHLORIDE 0.9 % (FLUSH) 0.9 %
5-10 SYRINGE (ML) INJECTION EVERY 8 HOURS
Status: DISCONTINUED | OUTPATIENT
Start: 2018-04-17 | End: 2018-04-20 | Stop reason: HOSPADM

## 2018-04-17 RX ORDER — SODIUM POLYSTYRENE SULFONATE 15 G/60ML
30 SUSPENSION ORAL; RECTAL
Status: COMPLETED | OUTPATIENT
Start: 2018-04-18 | End: 2018-04-18

## 2018-04-17 RX ORDER — HYDROCODONE BITARTRATE AND ACETAMINOPHEN 5; 325 MG/1; MG/1
1 TABLET ORAL
Status: DISCONTINUED | OUTPATIENT
Start: 2018-04-17 | End: 2018-04-20 | Stop reason: HOSPADM

## 2018-04-17 RX ORDER — ONDANSETRON 2 MG/ML
4 INJECTION INTRAMUSCULAR; INTRAVENOUS
Status: DISCONTINUED | OUTPATIENT
Start: 2018-04-17 | End: 2018-04-20 | Stop reason: HOSPADM

## 2018-04-17 RX ORDER — LEVETIRACETAM 500 MG/1
500 TABLET ORAL 2 TIMES DAILY
Status: DISCONTINUED | OUTPATIENT
Start: 2018-04-17 | End: 2018-04-18

## 2018-04-17 RX ORDER — SODIUM POLYSTYRENE SULFONATE 15 G/60ML
30 SUSPENSION ORAL; RECTAL
Status: COMPLETED | OUTPATIENT
Start: 2018-04-17 | End: 2018-04-17

## 2018-04-17 RX ORDER — ATENOLOL 25 MG/1
50 TABLET ORAL
Status: DISCONTINUED | OUTPATIENT
Start: 2018-04-17 | End: 2018-04-17

## 2018-04-17 RX ORDER — SODIUM CHLORIDE 0.9 % (FLUSH) 0.9 %
5-10 SYRINGE (ML) INJECTION AS NEEDED
Status: DISCONTINUED | OUTPATIENT
Start: 2018-04-17 | End: 2018-04-20 | Stop reason: HOSPADM

## 2018-04-17 RX ORDER — DEXTROSE 50 % IN WATER (D50W) INTRAVENOUS SYRINGE
25
Status: COMPLETED | OUTPATIENT
Start: 2018-04-18 | End: 2018-04-18

## 2018-04-17 RX ORDER — SODIUM POLYSTYRENE SULFONATE 15 G/60ML
45 SUSPENSION ORAL; RECTAL
Status: COMPLETED | OUTPATIENT
Start: 2018-04-17 | End: 2018-04-17

## 2018-04-17 RX ORDER — DRONABINOL 2.5 MG/1
5 CAPSULE ORAL 2 TIMES DAILY
Status: DISCONTINUED | OUTPATIENT
Start: 2018-04-17 | End: 2018-04-20 | Stop reason: HOSPADM

## 2018-04-17 RX ORDER — SODIUM CHLORIDE 9 MG/ML
100 INJECTION, SOLUTION INTRAVENOUS CONTINUOUS
Status: DISCONTINUED | OUTPATIENT
Start: 2018-04-17 | End: 2018-04-17

## 2018-04-17 RX ADMIN — DRONABINOL 5 MG: 2.5 CAPSULE ORAL at 21:00

## 2018-04-17 RX ADMIN — Medication 10 ML: at 22:00

## 2018-04-17 RX ADMIN — Medication 10 ML: at 14:00

## 2018-04-17 RX ADMIN — SODIUM POLYSTYRENE SULFONATE 45 G: 15 SUSPENSION ORAL; RECTAL at 01:46

## 2018-04-17 RX ADMIN — SODIUM POLYSTYRENE SULFONATE 30 G: 15 SUSPENSION ORAL; RECTAL at 16:14

## 2018-04-17 RX ADMIN — FUROSEMIDE 60 MG: 10 INJECTION, SOLUTION INTRAVENOUS at 22:02

## 2018-04-17 RX ADMIN — SODIUM CHLORIDE: 450 INJECTION, SOLUTION INTRAVENOUS at 13:02

## 2018-04-17 RX ADMIN — SODIUM CHLORIDE 40 MG: 9 INJECTION INTRAMUSCULAR; INTRAVENOUS; SUBCUTANEOUS at 02:33

## 2018-04-17 RX ADMIN — SODIUM CHLORIDE 40 MG: 9 INJECTION INTRAMUSCULAR; INTRAVENOUS; SUBCUTANEOUS at 16:14

## 2018-04-17 RX ADMIN — CALCIUM ACETATE 667 MG: 667 TABLET ORAL at 16:15

## 2018-04-17 RX ADMIN — HUMAN INSULIN 5 UNITS: 100 INJECTION, SOLUTION SUBCUTANEOUS at 01:46

## 2018-04-17 RX ADMIN — DEXTROSE MONOHYDRATE 25 G: 25 INJECTION, SOLUTION INTRAVENOUS at 01:46

## 2018-04-17 RX ADMIN — SODIUM CHLORIDE 100 ML/HR: 900 INJECTION, SOLUTION INTRAVENOUS at 04:00

## 2018-04-17 RX ADMIN — EPOETIN ALFA 10000 UNITS: 10000 SOLUTION INTRAVENOUS; SUBCUTANEOUS at 21:00

## 2018-04-17 RX ADMIN — ALBUTEROL SULFATE 5 MG: 2.5 SOLUTION RESPIRATORY (INHALATION) at 02:10

## 2018-04-17 NOTE — IP AVS SNAPSHOT
[id number] [address] 
[phone] Patient: Bennett Hook MRN: [id number] [id number] About your hospitalization You were admitted on:  April 17, 2018 You last received care in the:  [facility] 2N MED SURG You were discharged on:  April 20, 2018 Why you were hospitalized Your primary diagnosis was:  Acute Renal Failure (Arf) (Hcc) Follow-up Information Follow up With Details Comments Contact Info Rustam Bearden MD   [address] [facility] [address] 
[phone] AT [address] [facility] In 1 day  [facility] [address] Discharge Orders None A check macy indicates which time of day the medication should be taken. My Medications CHANGE how you take these medications Instructions Each Dose to Equal  
 Morning Noon Evening Bedtime HYDROcodone-acetaminophen 5-325 mg per tablet Commonly known as:  Gleda Ridgel What changed:  when to take this Your last dose was: Your next dose is: Take 1 Tab by mouth every six (6) hours as needed. Max Daily Amount: 4 Tabs. 1 Tab CONTINUE taking these medications Instructions Each Dose to Equal  
 Morning Noon Evening Bedtime  
 atenolol 25 mg tablet Commonly known as:  TENORMIN Your last dose was: Your next dose is: Take 25 mg by mouth daily. 25 mg  
    
   
   
   
  
 levETIRAcetam 500 mg tablet Commonly known as:  KEPPRA Your last dose was: Your next dose is: Take 1 Tab by mouth two (2) times a day. 500 mg  
    
   
   
   
  
  
STOP taking these medications   
 aspirin delayed-release 81 mg tablet Where to Get Your Medications Information on where to get these meds will be given to you by the nurse or doctor. ! Ask your nurse or doctor about these medications HYDROcodone-acetaminophen 5-325 mg per tablet Opioid Education Prescription Opioids: What You Need to Know: 
 
Prescription opioids can be used to help relieve moderate-to-severe pain and are often prescribed following a surgery or injury, or for certain health conditions. These medications can be an important part of treatment but also come with serious risks. Opioids are strong pain medicines. Examples include hydrocodone, oxycodone, fentanyl, and morphine. Heroin is an example of an illegal opioid. It is important to work with your health care provider to make sure you are getting the safest, most effective care. WHAT ARE THE RISKS AND SIDE EFFECTS OF OPIOID USE? Prescription opioids carry serious risks of addiction and overdose, especially with prolonged use. An opioid overdose, often marked by slow breathing, can cause sudden death. The use of prescription opioids can have a number of side effects as well, even when taken as directed. · Tolerance-meaning you might need to take more of a medication for the same pain relief · Physical dependence-meaning you have symptoms of withdrawal when the medication is stopped. Withdrawal symptoms can include nausea, sweating, chills, diarrhea, stomach cramps, and muscle aches. Withdrawal can last up to several weeks, depending on which drug you took and how long you took it. · Increased sensitivity to pain · Constipation · Nausea, vomiting, and dry mouth · Sleepiness and dizziness · Confusion · Depression · Low levels of testosterone that can result in lower sex drive, energy, and strength · Itching and sweating RISKS ARE GREATER WITH:      
· History of drug misuse, substance use disorder, or overdose · Mental health conditions (such as depression or anxiety) · Sleep apnea · Older age (72 years or older) · Pregnancy Avoid alcohol while taking prescription opioids.   Also, unless specifically advised by your health care provider, medications to avoid include: · Benzodiazepines (such as Xanax or Valium) · Muscle relaxants (such as Soma or Flexeril) · Hypnotics (such as Ambien or Lunesta) · Other prescription opioids KNOW YOUR OPTIONS Talk to your health care provider about ways to manage your pain that don't involve prescription opioids. Some of these options may actually work better and have fewer risks and side effects. Options may include: 
· Pain relievers such as acetaminophen, ibuprofen, and naproxen · Some medications that are also used for depression or seizures · Physical therapy and exercise · Counseling to help patients learn how to cope better with triggers of pain and stress. · Application of heat or cold compress · Massage therapy · Relaxation techniques Be Informed Make sure you know the name of your medication, how much and how often to take it, and its potential risks & side effects. IF YOU ARE PRESCRIBED OPIOIDS FOR PAIN: 
· Never take opioids in greater amounts or more often than prescribed. Remember the goal is not to be pain-free but to manage your pain at a tolerable level. · Follow up with your primary care provider to: · Work together to create a plan on how to manage your pain. · Talk about ways to help manage your pain that don't involve prescription opioids. · Talk about any and all concerns and side effects. · Help prevent misuse and abuse. · Never sell or share prescription opioids · Help prevent misuse and abuse. · Store prescription opioids in a secure place and out of reach of others (this may include visitors, children, friends, and family). · Safely dispose of unused/unwanted prescription opioids: Find your community drug take-back program or your pharmacy mail-back program, or flush them down the toilet, following guidance from the Food and Drug Administration (www.fda.gov/Drugs/ResourcesForYou). · Visit www.cdc.gov/drugoverdose to learn about the risks of opioid abuse and overdose. · If you believe you may be struggling with addiction, tell your health care provider and ask for guidance or call [address] at 5-396-232-RFPR. Discharge Instructions None [email or web address] Announcement We are excited to announce that we are making your provider's discharge notes available to you in [email or web address]. You will see these notes when they are completed and signed by the physician that discharged you from your recent hospital stay. If you have any questions or concerns about any information you see in [email or web address], please call the Health Information Department where you were seen or reach out to your Primary Care Provider for more information about your plan of care. Introducing [facility] SERVICES! Irene Locke introduces [email or web address] patient portal. Now you can access parts of your medical record, email your doctor's office, and request medication refills online. 1. In your internet browser, go to [email or web address] 2. Click on the First Time User? Click Here link in the Sign In box. You will see the New Member Sign Up page. 3. Enter your [email or web address] Access Code exactly as it appears below. You will not need to use this code after youve completed the sign-up process. If you do not sign up before the expiration date, you must request a new code. · [email or web address] Access Code: [email or web address] Expires: 5/10/2018  9:13 AM 
 
4. Enter the last four digits of your Social Security Number (xxxx) and Date of Birth (mm/dd/yyyy) as indicated and click Submit. You will be taken to the next sign-up page. 5. Create a [email or web address] ID. This will be your [email or web address] login ID and cannot be changed, so think of one that is secure and easy to remember. 6. Create a [email or web address] password. You can change your password at any time. 7. Enter your Password Reset Question and Answer. This can be used at a later time if you forget your password. 8. Enter your e-mail address. You will receive e-mail notification when new information is available in [address] 9. Click Sign Up. You can now view and download portions of your medical record. 10. Click the Download Summary menu link to download a portable copy of your medical information. If you have questions, please visit the Frequently Asked Questions section of the [employer] website. Remember, [employer] is NOT to be used for urgent needs. For medical emergencies, dial 911. Now available from your iPhone and Android! Introducing Nitin Mathew As a [employer] patient, I wanted to make you aware of our electronic visit tool called Nitin Mathew. [employer] 24/7 allows you to connect within minutes with a medical provider 24 hours a day, seven days a week via a mobile device or tablet or logging into a secure website from your computer. You can access Nitin Mathew from anywhere in the [place]. A virtual visit might be right for you when you have a simple condition and feel like you just dont want to get out of bed, or cant get away from work for an appointment, when your regular [employer] provider is not available (evenings, weekends or holidays), or when youre out of town and need minor care. Electronic visits cost only $49 and if the [employer] 24/7 provider determines a prescription is needed to treat your condition, one can be electronically transmitted to a nearby pharmacy*. Please take a moment to enroll today if you have not already done so. The enrollment process is free and takes just a few minutes. To enroll, please download the [employer] 24/7 marlen to your tablet or phone, or visit [email or web address] to enroll on your computer.    
And, as an [address] [place] [address] patient with a [employer] account, the results of your visits will be scanned into your electronic medical record and your primary care provider will be able to view the scanned results. We urge you to continue to see your regular [facility] provider for your ongoing medical care. And while your primary care provider may not be the one available when you seek a Nitin Jacksonfin virtual visit, the peace of mind you get from getting a real diagnosis real time can be priceless. For more information on [employer], view our Frequently Asked Questions (FAQs) at [email or web address]. Sincerely, 
 
Kaitlin Ramos MD 
Chief Medical Officer 50Kathleen Carmichael *:  certain medications cannot be prescribed via [employer] Unresulted Labs-Please follow up with your PCP about these lab tests Order Current Status OVA & PARASITES, STOOL In process CULTURE, STOOL Preliminary result Providers Seen During Your Hospitalization Provider Specialty Primary office phone Araceli Paul MD Emergency Medicine [phone] Irma Lee MD Internal Medicine [phone] Sondra Singh MD Internal Medicine [phone] Truman Chan MD Family Practice [phone] Your Primary Care Physician (PCP) Primary Care Physician Office Phone Office Fax Teresa Hearn [address] You are allergic to the following No active allergies Recent Documentation Height Weight BMI Smoking Status 1.651 m 62.4 kg 22.89 kg/m2 Former Smoker Emergency Contacts Name Discharge Info Relation Home Work Mobile Venkat Zelaya DISCHARGE CAREGIVER [3] Child [2] [phone] [address]  Son [22] [phone] Patient Belongings  The following personal items are in your possession at time of discharge: 
  Dental Appliances: Uppers, With patient, Lowers  Visual Aid: Glasses Home Medications: None   Jewelry: None  Clothing: At bedside, Footwear, Jacket/Coat, Pants, Shirt, Undergarments    Other Valuables: Eyeglasses, Other (comment), Walker (pt states left walker in ED) Please provide this summary of care documentation to your next provider. Signatures-by signing, you are acknowledging that this After Visit Summary has been reviewed with you and you have received a copy. Patient Signature:  ____________________________________________________________ Date:  ____________________________________________________________  
  
Claudene Born Provider Signature:  ____________________________________________________________ Date:  ____________________________________________________________

## 2018-04-17 NOTE — CONSULTS
[address]    Zeke Peabody  MR#: [id number]  : 1935  ACCOUNT #: [de-identified]   DATE OF SERVICE: 2018    HISTORY OF PRESENT ILLNESS:  The patient is an 80-year-old gentleman with multiple medical problems who was seen in the [facility] Emergency Room on 2018, after he was admitted for acute renal failure and anasarca. This gentleman has multiple medical problems including prior stroke, hypertension and prostate cancer. He was admitted to the hospital on 2018 through the  for syncope and at that time, pericardial effusion and ascites were noted. He was sent home and after discharge, the patient notes that he has been taking NSAIDs on a daily basis for joint discomfort. He presented to his primary care physician on the day prior to admission with lower extremity edema. A D-dimer study was performed and was elevated and he was sent for further evaluation. In the emergency room, Doppler ultrasounds were performed and were negative for DVT. V/Q scan results are pending. PAST MEDICAL HISTORY:  Significant for GERD, hypertension, stroke, pericardial effusion diagnosed 2018. He has a history of a parotid tumor, as well as prostate cancer. Past medical history also includes arthritis, hearing loss, congestive heart failure, hypercholesterolemia, hypertension, history of CVA. PAST SURGICAL HISTORY:  Includes appendectomy, left parotid gland surgical resection 2011. Right rotator cuff repair . Pacemaker placement. He has had prostate surgery. SOCIAL HISTORY:  He is . He lives in [place]. He is a nondrinker. Former smoker. FAMILY HISTORY:  His mother had cancer of unknown type. ALLERGIES:  NO KNOWN DRUG ALLERGIES. REVIEW OF SYSTEMS:  Denies fevers, bleeding. Review of systems otherwise noncontributory.     PHYSICAL EXAMINATION:  GENERAL:  He is a chronically ill-appearing gentleman in no apparent distress. VITAL SIGNS:  Temperature 98, pulse 66, /66, respirations 14, O2 sat 96% on room air. HEENT:  He has a left facial droop. Nonicteric sclerae. NECK:  Supple. He has a parotidectomy scar. No lymphadenopathy noted. LUNGS:  Diminished breath sounds in the bases. HEART:  Regular rate, I/VI systolic murmur. ABDOMEN:  Soft, nontender, no hepatosplenomegaly noted. EXTREMITIES:  There is edema present bilaterally. NEUROLOGIC AO x3. Cranial nerves II-XII are intact with the exception of the left facial droop. LABORATORY DATA:  White count 2.6, hemoglobin 7.8, , platelet count 98, BUN and creatinine are 74 and 8.7 respectively. Bilirubin is 0.4. ASSESSMENT AND PLAN:  1. Anemia. The patient has guaiac positive stool. He has acute renal failure. He has several different potential causes for anemia. In addition, there is the possibility of a primary bone marrow process such as multiple myeloma or myelodysplasia. Hemolysis seems less likely in the setting of a normal bilirubin. His INR and PTT are both in range, making DIC less likely as well. 2.  Leukopenia and thrombocytopenia. These are mild. They have been present dating back to at least February of this year. Differential diagnosis includes medications, primary marrow process, immune cytopenias. This gentleman has already been seen by Gastroenterology to evaluate the stool guaiac. He is going to need Nephrology evaluation, as well as Urology to evaluate his hydronephrosis and elevated creatinine. He could benefit from Procrit. We will order an anemia evaluation and consider doing a bone marrow biopsy later in the hospital stay as we await the results of the workup.       MD PATRICE Kline / SURYA  D: 04/17/2018 10:48     T: 04/17/2018 11:18  JOB #: [id number]

## 2018-04-17 NOTE — ED NOTES
Pt resting comfortably on stretcher. Denies needs at this time. Call light within reach, patient instructed on use. Patient cleaned. Large amount of loose stool.

## 2018-04-17 NOTE — ED PROVIDER NOTES
HPI Comments: 80 y.o. male with past medical history significant for pacemaker, GERD, HTN, Stroke, Cancer, Appendectomy who presents from home accompanied by family with chief complaint of leg swelling. Per family, pt attended an appointment with his primary care today for symptoms of leg swelling and diarrhea over the past couple of days. Consistency and color of stool unknown. While at PCP, pt had blood work performed. Family received a call this evening stating that he should be evaluated in ED for \"being high risk for a blood clot\" due to an elevated D-dimer. While in Ed, pt deneis any pain. He has a pacemaker in place for unexplained syncope. He also is a patient with cancer (prostate and mucoepidermoid). He has had radiation and surgery in the past for his cancer but has declined further treatment. Pt takes Atenolol and Baby aspirin daily. NKDA. Pt denies chest pain, shortness of breath, known fever, abdominal pain, headache, arm pain or leg pain. There are no other acute medical concerns at this time. Chart review: Pt admitted 2/6/18-2/9/18 for syncope with prolonged arousal. EEG and Carotid doppler negative. Pericardial effusion and ascities noted. Parotic tumor noted, suspected to not be related to syncope. Pt discharged home with family on Keppra and Marinol. Social hx: Lives alone. 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 Ambulates with walker. PCP: Nataliia Estrada MD    Note written by Vero Bosch, as dictated by Evan Roach MD 1:28 AM    The history is provided by the patient and a relative. No  was used.         Past Medical History:   Diagnosis Date    Arthritis     Cancer [facility]) 2004    malignant neoplasm of salivary glands    GERD (gastroesophageal reflux disease)     Hearing loss     bilateral    Heart failure (HCC)     HAS PACER    Hypercholesteremia     Hypertension     medicated    Other ill-defined conditions(259.89)     facial nerve disorder    Other ill-defined conditions(799.89)     enlarged prostate    Other ill-defined conditions(799.89)     tumor on parotid gland    Stroke [facility]) 2005       Past Surgical History:   Procedure Laterality Date    HX APPENDECTOMY      HX HEENT  5/2011    parotid gland surgery    HX ORTHOPAEDIC  1980's    Rotator cuff repair-RIGHT    HX OTHER SURGICAL      keloid removed left shoulder    HX PACEMAKER  2016    HX UROLOGICAL      prostate surgery         Family History:   Problem Relation Age of Onset    Cancer Mother      UNKNOWN LOCATION    Anesth Problems Neg Hx        Social History     Social History    Marital status:      Spouse name: N/A    Number of children: N/A    Years of education: N/A     Occupational History    Not on file. Social History Main Topics    Smoking status: Former Smoker     Packs/day: 1.00     Years: 20.00     Quit date: 5/18/1980    Smokeless tobacco: Never Used    Alcohol use No    Drug use: No    Sexual activity: Not on file     Other Topics Concern    Not on file     Social History Narrative         ALLERGIES: Review of patient's allergies indicates no known allergies. Review of Systems   Constitutional: Negative for fever. Respiratory: Negative for shortness of breath. Cardiovascular: Positive for leg swelling. Negative for chest pain. Gastrointestinal: Positive for diarrhea. Negative for abdominal pain, nausea and vomiting. Musculoskeletal: Negative for myalgias. Neurological: Negative for headaches. Vitals:    04/16/18 2323   BP: 148/59   Pulse: 69   Resp: 16   Temp: 98.1 °F (36.7 °C)   SpO2: 99%   Weight: 57.2 kg (126 lb)   Height: 5' 5\" (1.651 m)            Physical Exam   Nursing note and vitals reviewed. CONSTITUTIONAL: Well-appearing; well-nourished; in no apparent distress  HEAD: Normocephalic; atraumatic  EYES: PERRL; EOM intact; conjunctiva and sclera are clear bilaterally.   ENT: No rhinorrhea; normal pharynx with no tonsillar hypertrophy; mucous membranes pink/moist, no erythema, no exudate. NECK: Supple; non-tender; no cervical lymphadenopathy  CARD: Normal S1, S2; no murmurs, rubs, or gallops. Regular rate and rhythm. RESP: Normal respiratory effort; breath sounds clear and equal bilaterally; no wheezes, rhonchi, or rales. ABD: Normal bowel sounds; non-distended; non-tender; no palpable organomegaly, no masses, no bruits. Back Exam: Normal inspection; no vertebral point tenderness, no CVA tenderness. Normal range of motion. EXT: Normal ROM in all four extremities; non-tender to palpation; no swelling or deformity; distal pulses are normal, 2+ pitting edema bilaterally. SKIN: Warm; dry; no rash. NEURO:Alert and oriented x 3, coherent, JU-XII grossly intact, left facial droop; sensory and motor are non-focal.  Rectal exam: normal inspection; non-tender; weak tone; enlarged prostate; normal color stools        MDM  Number of Diagnoses or Management Options  Acute hyperkalemia:   Acute renal failure, unspecified acute renal failure type [facility]): Anasarca associated with disorder of kidney:   Anemia, unspecified type: Bony metastasis [facility]):   Pleural effusion:   Diagnosis management comments:   Assessment: 80-year-old male, who presents with swelling, and anasarca, and progressive weakness and shortness of breath-rule out ACS, electrolyte abnormality,VTE, anemia, dehydration        Plan: EKG/ chest x-ray/ lab/ IV fluid/ CT scan of the abdomen and pelvis/ serial exam/ consult hospitalist/ Monitor and Reevaluate.          Amount and/or Complexity of Data Reviewed  Clinical lab tests: ordered and reviewed  Tests in the radiology section of CPT®: ordered and reviewed  Tests in the medicine section of CPT®: reviewed and ordered  Discussion of test results with the performing providers: yes  Decide to obtain previous medical records or to obtain history from someone other than the patient: yes  Obtain history from someone other than the patient: yes  Review and summarize past medical records: yes  Discuss the patient with other providers: yes  Independent visualization of images, tracings, or specimens: yes    Risk of Complications, Morbidity, and/or Mortality  Presenting problems: moderate  Diagnostic procedures: moderate  Management options: moderate    Critical Care  Total time providing critical care: (Total critical care time spent exclusive of procedures: 45 minutes)    Patient Progress  Patient progress: stable        ED Course       Procedures     ED EKG interpretation:  Rhythm: normal sinus rhythm; and regular . Rate (approx.): 66; Axis: normal; P wave: normal; QRS interval: normal ; ST/T wave: non-specific changes; in  Lead: Diffusely; Other findings: abnormal ekg. This EKG was interpreted by Luiz Bumpers, MD,ED Provider. XRAY INTERPRETATION (ED MD)  Chest Xray  No acute process seen. Normal heart size. No bony abnormalities. No infiltrate. Luiz Bumpers, MD 1:23 AM    PROGRESS NOTE:  Pt has been reexamined by Luiz Bumpers, MD all available results have been reviewed with pt and any available family. Pt understands sx, dx, and tx in ED. Care plan has been outlined and questions have been answered. Pt and any available family understands and agrees to need for admission to hospital for further tx not available in ED. Pt is ready for admission. Written by Luiz Bumpers, MD,  7:04 AM      CONSULT NOTE:  Luiz Bumpers, MD spoke with Dr. Juan Diego Watson of the adult hospitalist team. Discussed patient's presentation, history, physical assessment, and available diagnostic results.  He will evaluate, write orders and admit the patient to the hospital. 7:05 AM    .

## 2018-04-17 NOTE — PROCEDURES
[facility]  *** FINAL REPORT ***    Name: Blake Caballero  MRN: [id number]  : 10 Feb 1935  HIS Order #: [id number]  [id number] [facility] Visit #: [id number]  Date: 2018    TYPE OF TEST: Peripheral Venous Testing    REASON FOR TEST  Limb swelling    Right Leg:-  Deep venous thrombosis:           No  Superficial venous thrombosis:    No  Deep venous insufficiency:        Not examined  Superficial venous insufficiency: Not examined    Left Leg:-  Deep venous thrombosis:           No  Superficial venous thrombosis:    No  Deep venous insufficiency:        Not examined  Superficial venous insufficiency: Not examined      INTERPRETATION/FINDINGS  PROCEDURE:  Color duplex ultrasound imaging of lower extremity veins. FINDINGS:       Right: The common femoral, deep femoral, femoral, popliteal,  posterior tibial, peroneal, and great saphenous are patent and without   evidence of thrombus;  each is fully compressible and there is no  narrowing of the flow channel on color Doppler imaging. Phasic flow  is observed in the common femoral vein. Left:   The common femoral, deep femoral, femoral, popliteal,  posterior tibial, peroneal, and great saphenous are patent and without   evidence of thrombus;  each is fully compressible and there is no  narrowing of the flow channel on color Doppler imaging. Phasic flow  is observed in the common femoral vein. IMPRESSION:  No evidence of right or left lower extremity vein  thrombosis. ADDITIONAL COMMENTS    I have personally reviewed the data relevant to the interpretation of  this  study.     TECHNOLOGIST: Alda Best RDMS  Signed: 2018 01:02 AM    PHYSICIAN: Yue Cespedes MD  Signed: 2018 07:14 AM

## 2018-04-17 NOTE — PROGRESS NOTES
Contacted [facility] in [place] at [phone]. Patient has not filled keppra 500 mg since 2/9 for a 30 day supply. Also, filled a script for furosemide 20 mg daily that was picked up yesterday; new Rx.

## 2018-04-17 NOTE — CONSULTS
[address] [facility] NOTE  [facility]  [phone] NP in-hospital cell phone M-F until 4:30  After 5pm or on weekends, please call  for physician on call        NAME:  Ki Velazquez   :   1935   MRN:   [id number]       Referring Physician: Lisa Parker    Consult Date: 2018 9:19 AM    Chief Complaint: GI bleed     History of Present Illness:  Patient is a 80 y.o. who is seen in consultation at the request of Dr. Bharat Lares for GI bleed. Medical history as listed below includes stroke, prostate and mucoepidermoid cancer, hypertension, GERD, pacemaker. Patient presented to the hospital for lower extremity edema, elevated d-dimer, and diarrhea. Patient reports one week of diarrhea (unclear if obvious blood). He has some lower abdominal cramping associated with diarrhea. He denies reflux, dysphagia, hematochezia, or melena. He reports daily NSAID use. He believes he had a colonoscopy in the . I have reviewed the emergency room note, hospital admission note, notes by all other clinicians who have seen the patient during this hospitalization to date. I have reviewed the problem list and the reason for this hospitalization. I have reviewed the allergies and the medications the patient was taking at home prior to this hospitalization.     PMH:  Past Medical History:   Diagnosis Date    Arthritis     Cancer [facility]) 2004    malignant neoplasm of salivary glands    GERD (gastroesophageal reflux disease)     Hearing loss     bilateral    Heart failure ([address]     HAS PACER    Hypercholesteremia     Hypertension     medicated    Other ill-defined conditions(799.89)     facial nerve disorder    Other ill-defined conditions(799.89)     enlarged prostate    Other ill-defined conditions(799.89)     tumor on parotid gland    Stroke ([address] 2005       PSH:  Past Surgical History: Procedure Laterality Date    HX APPENDECTOMY      HX HEENT  5/2011    parotid gland surgery    HX ORTHOPAEDIC  1980's    Rotator cuff repair-RIGHT    HX OTHER SURGICAL      keloid removed left shoulder    HX PACEMAKER  2016    HX UROLOGICAL      prostate surgery       Allergies:  No Known Allergies    Home Medications:  Prior to Admission Medications   Prescriptions Last Dose Informant Patient Reported? Taking?   aspirin delayed-release 81 mg tablet 4/16/2018 at Unknown time  No Yes   Sig: Take 1 Tab by mouth daily. atenolol (TENORMIN) 50 mg tablet 4/16/2018 at Unknown time  Yes Yes   Sig: Take 50 mg by mouth every morning. levETIRAcetam (KEPPRA) 500 mg tablet Unknown at Unknown time  No No   Sig: Take 1 Tab by mouth two (2) times a day. Facility-Administered Medications: None       Hospital Medications:  Current Facility-Administered Medications   Medication Dose Route Frequency    0.9% sodium chloride infusion 250 mL  250 mL IntraVENous PRN    0.9% sodium chloride infusion  100 mL/hr IntraVENous CONTINUOUS    pantoprazole (PROTONIX) 40 mg in sodium chloride 0.9% 10 mL injection  40 mg IntraVENous Q12H    atenolol (TENORMIN) tablet 50 mg  50 mg Oral 7am    dronabinol (MARINOL) capsule 5 mg  5 mg Oral BID    HYDROcodone-acetaminophen (NORCO) 5-325 mg per tablet 1 Tab  1 Tab Oral Q4H PRN    levETIRAcetam (KEPPRA) tablet 500 mg  500 mg Oral BID    sodium chloride (NS) flush 5-10 mL  5-10 mL IntraVENous Q8H    sodium chloride (NS) flush 5-10 mL  5-10 mL IntraVENous PRN    ondansetron (ZOFRAN) injection 4 mg  4 mg IntraVENous Q4H PRN    calcium acetate (PHOSLO) tablet 667 mg  1 Tab Oral TID WITH MEALS    0.9% sodium chloride infusion 250 mL  250 mL IntraVENous PRN     Current Outpatient Prescriptions   Medication Sig    aspirin delayed-release 81 mg tablet Take 1 Tab by mouth daily.  atenolol (TENORMIN) 50 mg tablet Take 50 mg by mouth every morning.     levETIRAcetam (KEPPRA) 500 mg tablet Take 1 Tab by mouth two (2) times a day.        Social History:  Social History   Substance Use Topics    Smoking status: Former Smoker     Packs/day: 1.00     Years: 20.00     Quit date: 5/18/1980    Smokeless tobacco: Never Used    Alcohol use No       Family History:  Family History   Problem Relation Age of Onset    Cancer Mother      UNKNOWN LOCATION    Anesth Problems Neg Hx        Review of Systems:  Constitutional: negative fever, negative chills, negative weight loss  Eyes:   negative visual changes  ENT:   negative sore throat, tongue or lip swelling  Respiratory:  negative cough, +dyspnea  Cards:  negative for chest pain, palpitations, +lower extremity edema  GI:   See HPI  :  negative for frequency, dysuria  Integument:  negative for rash and pruritus  Heme:  negative for easy bruising and gum/nose bleeding  Musculoskeletal:+for myalgias, back pain and muscle weakness  Neuro:  negative for headaches, dizziness, vertigo  Psych:  negative for feelings of anxiety, depression     Objective:   Patient Vitals for the past 8 hrs:   BP Temp Pulse Resp SpO2   04/17/18 0700 134/66 98.4 °F (36.9 °C) 66 14 96 %   04/17/18 0530 127/53 98.4 °F (36.9 °C) 73 14 99 %   04/17/18 0500 120/42 - 72 13 97 %   04/17/18 0430 124/47 - 73 12 99 %   04/17/18 0400 142/63 - 69 16 96 %   04/17/18 0347 145/64 - 76 16 95 %   04/17/18 0345 (!) 129/113 - 76 10 95 %   04/17/18 0315 161/72 98.1 °F (36.7 °C) 80 19 99 %   04/17/18 0300 149/56 98.1 °F (36.7 °C) 74 15 99 %   04/17/18 0245 133/62 98 °F (36.7 °C) 74 16 98 %   04/17/18 0230 128/53 98 °F (36.7 °C) 73 14 100 %   04/17/18 0203 - - - - 95 %             EXAM:     CONST:  Pleasant male lying in bed, no acute distress   NEURO:  alert and oriented x 3   HEENT: EOMI, no scleral icterus; hard of hearing   LUNGS: clear to ausculation, (-) wheeze   CARD:  regular rate and rhythm, S1 S2   ABD:  soft, no tenderness, no rebound, bowel sounds (+) all 4 quadrants, no masses, non distended   EXT:  Right contracted, warm   PSYCH: full, not anxious     Data Review     Recent Labs      04/17/18   0658  04/16/18   2341   WBC  2.6*  3.1*   HGB  7.8*  6.5*   HCT  24.8*  21.1*   PLT  98*  110*     Recent Labs      04/17/18   0658  04/16/18   2341   NA  140  137   K  6.1*  6.8*   CL  109*  106   CO2  19*  20*   BUN  76*  74*   CREA  8.64*  8.51*   GLU  82  94   PHOS   --   5.0*   CA  8.5  8.6     Recent Labs      04/17/18   0658  04/16/18   2341   SGOT  15  15   AP  358*  381*   TP  5.5*  5.8*   ALB  2.2*  2.4*   GLOB  3.3  3.4   AML   --   45   LPSE   --   181     Recent Labs      04/16/18   2341   INR  1.1   PTP  11.5*   APTT  23.1       IMPRESSION: Cardiomegaly with bilateral effusions. Bilateral hydronephrosis. Diffuse bony metastases. 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 Lindsey Munoz and his daughter. They are not interested in invasive studies and I agree. Stool cultures to be sent, but I think Palliative care or Hospice is most appropriate, thanks.     Kareem Hernandez MD

## 2018-04-17 NOTE — PROGRESS NOTES
Have read notes from nephrology and agree with warren in this case. Reviewed consult request with Dr. Priscilla Sainz and he would like to see patient as outpatient. Pt was last seen by Dr. Priscilla Sainz 4/25/2017. Spoke to patient and daughter and they are in agreement. Urology consult cancelled but will follow and available as needed.     Oneil Espinoza RN,  Urology RN Coordinator,  [phone]

## 2018-04-17 NOTE — ED TRIAGE NOTES
Triage note: Pt arrives ambulatory as a referral by PCP because he's a high risk for blood clots. No complaints. Pt admits to SOB \"at times. \"    Pt also notes bilateral leg swelling x 2 weeks.

## 2018-04-17 NOTE — IP AVS SNAPSHOT
[address] 
[phone] Patient: Joaquín Nazario MRN: [id number] [facility]:[birth year] A check macy indicates which time of day the medication should be taken. My Medications CHANGE how you take these medications Instructions Each Dose to Equal  
 Morning Noon Evening Bedtime HYDROcodone-acetaminophen 5-325 mg per tablet Commonly known as:  [place] No What changed:  when to take this Your last dose was: Your next dose is: Take 1 Tab by mouth every six (6) hours as needed. Max Daily Amount: 4 Tabs. 1 Tab CONTINUE taking these medications Instructions Each Dose to Equal  
 Morning Noon Evening Bedtime  
 atenolol 25 mg tablet Commonly known as:  TENORMIN Your last dose was: Your next dose is: Take 25 mg by mouth daily. 25 mg  
    
   
   
   
  
 levETIRAcetam 500 mg tablet Commonly known as:  KEPPRA Your last dose was: Your next dose is: Take 1 Tab by mouth two (2) times a day. 500 mg  
    
   
   
   
  
  
STOP taking these medications   
 aspirin delayed-release 81 mg tablet Where to Get Your Medications Information on where to get these meds will be given to you by the nurse or doctor. ! Ask your nurse or doctor about these medications HYDROcodone-acetaminophen 5-325 mg per tablet

## 2018-04-17 NOTE — PROGRESS NOTES
Admission Medication Reconciliation:    Information obtained from: patient    Significant PMH/Disease States:   Past Medical History:   Diagnosis Date    Arthritis     Cancer [facility]) 2004    malignant neoplasm of salivary glands    GERD (gastroesophageal reflux disease)     Hearing loss     bilateral    Heart failure ([address]     HAS PACER    Hypercholesteremia     Hypertension     medicated    Other ill-defined conditions(799.89)     facial nerve disorder    Other ill-defined conditions(799.89)     enlarged prostate    Other ill-defined conditions(799.89)     tumor on parotid gland    Stroke [facility]) 2005       Chief Complaint for this Admission:  SOB, lower extremity swelling    Allergies:  Review of patient's allergies indicates no known allergies. Prior to Admission Medications:   Prior to Admission Medications   Prescriptions Last Dose Informant Patient Reported? Taking?   aspirin delayed-release 81 mg tablet 4/16/2018 at Unknown time  No Yes   Sig: Take 1 Tab by mouth daily. atenolol (TENORMIN) 50 mg tablet 4/16/2018 at Unknown time  Yes Yes   Sig: Take 50 mg by mouth every morning. levETIRAcetam (KEPPRA) 500 mg tablet Unknown at Unknown time  No No   Sig: Take 1 Tab by mouth two (2) times a day. Facility-Administered Medications: None         Comments/Recommendations: Removed dronabinol and norco. Patient confirmed atenolol and aspirin. However, unable to determine if keppra is a current medication. Attempting to contact patient's pharmacy and alerting admitting provider.

## 2018-04-17 NOTE — PROGRESS NOTES
Hospitalist Progress Note  Clint Blair MD  Answering service: [phone] OR [phone] from in house phone        Date of Service:  2018  NAME:  Yojana Layne  :  1935  MRN:  [id number]      Reason for follow up:   jonnie    Interval history / Subjective: Warren placed. Assessment & Plan: JONNIE  : admitted this am.  Consults for nephrology already placed on admission. Order warren. Continue ivf. UA noted. Recheck in am.  Baseline creatinine ~1.3 on last recheck prior to this admission. Hyperkalemia  : s/p insulin, kayexalate at 0100. Repeat stat bmp with K now 5.9. Nephrology consulted. Given another 30gm of kayexalate. Repeat bmp at 1900    Bilat hydronephrosis  : warren ordered. Nephrology and urology consulted on admission. Code Status: full  DVT prophylaxis: scd    Care Plan discussed with : pt and family  Disposition: tbd  Estimated date of d/c: tbd    Hospital Problems  Date Reviewed: 2018          Codes Class Noted POA    * (Principal)Acute renal failure (ARF) ([facility] ICD-10-CM: N17.9  ICD-9-CM: 584.9  2018 Yes                Review of Systems:   Review of Systems   Constitutional: Negative for chills and fever. Respiratory: Negative for cough and shortness of breath. Cardiovascular: Negative for chest pain and palpitations. Gastrointestinal: Negative for abdominal pain, nausea and vomiting. Vital Signs:    Last 24hrs VS reviewed since prior progress note. Most recent are:  Visit Vitals    /66 (BP 1 Location: Right arm, BP Patient Position: At rest;Supine)    Pulse 66    Temp 98.4 °F (36.9 °C)    Resp 14    Ht 5' 5\" (1.651 m)    Wt 57.2 kg (126 lb)    SpO2 96%    BMI 20.97 kg/m2       No intake or output data in the 24 hours ending 18 0935     Physical Examination:   Physical Exam   Constitutional: No distress.    Cardiovascular: Normal rate, regular rhythm and normal heart sounds. Pulmonary/Chest: Effort normal and breath sounds normal. No respiratory distress. Abdominal: Soft. Bowel sounds are normal. He exhibits no distension. Musculoskeletal: He exhibits edema. He exhibits no tenderness. Neurological: He is alert. A cranial nerve deficit is present. He exhibits abnormal muscle tone. Left hemiparesis   Skin: Skin is warm and dry. He is not diaphoretic. Psychiatric: Mood and affect normal.   Nursing note and vitals reviewed. Data Review:    Review and/or order of clinical lab test      Labs:     Recent Labs      04/17/18 0658  04/16/18   2341   WBC  2.6*  3.1*   HGB  7.8*  6.5*   HCT  24.8*  21.1*   PLT  98*  110*     Recent Labs      04/17/18 0658  04/16/18   2341   NA  140  137   K  6.1*  6.8*   CL  109*  106   CO2  19*  20*   BUN  76*  74*   CREA  8.64*  8.51*   GLU  82  94   CA  8.5  8.6   MG   --   2.0   PHOS   --   5.0*     Recent Labs      04/17/18 0658  04/16/18   2341   SGOT  15  15   ALT  8*  8*   AP  358*  381*   TBILI  0.4  0.4   TP  5.5*  5.8*   ALB  2.2*  2.4*   GLOB  3.3  3.4   AML   --   45   LPSE   --   181     Recent Labs      04/16/18   2341   INR  1.1   PTP  11.5*   APTT  23.1      No results for input(s): FE, TIBC, PSAT, FERR in the last 72 hours. Lab Results   Component Value Date/Time    Folate 26.4 (H) 02/06/2018 10:32 AM      No results for input(s): PH, PCO2, PO2 in the last 72 hours.   Recent Labs      04/17/18 0658  04/16/18   2341   CPK  51   --    CKNDX  3.5*   --    TROIQ  <0.04  <0.04     Lab Results   Component Value Date/Time    Cholesterol, total 112 04/17/2018 06:58 AM    HDL Cholesterol 39 04/17/2018 06:58 AM    LDL, calculated 47.8 04/17/2018 06:58 AM    Triglyceride 126 04/17/2018 06:58 AM    CHOL/HDL Ratio 2.9 04/17/2018 06:58 AM     Lab Results   Component Value Date/Time    Glucose (POC) 88 04/17/2018 01:09 AM    Glucose (POC) 101 (H) 02/09/2018 07:17 AM    Glucose (POC) 132 (H) 02/08/2018 09:42 PM    Glucose (POC) 97 02/08/2018 05:01 PM    Glucose (POC) 116 (H) 11/07/2016 12:09 PM    Glucose (POC) 99 11/07/2016 06:22 AM     Lab Results   Component Value Date/Time    Color STRAW 04/17/2018 01:27 AM    Appearance CLEAR 04/17/2018 01:27 AM    Specific gravity 1.010 04/17/2018 01:27 AM    pH (UA) 6.5 04/17/2018 01:27 AM    Protein NEGATIVE  04/17/2018 01:27 AM    Glucose NEGATIVE  04/17/2018 01:27 AM    Ketone NEGATIVE  04/17/2018 01:27 AM    Bilirubin NEGATIVE  04/17/2018 01:27 AM    Urobilinogen 0.2 04/17/2018 01:27 AM    Nitrites NEGATIVE  04/17/2018 01:27 AM    Leukocyte Esterase NEGATIVE  04/17/2018 01:27 AM    Epithelial cells FEW 04/17/2018 01:27 AM    Bacteria NEGATIVE  04/17/2018 01:27 AM    WBC 10-20 04/17/2018 01:27 AM    RBC 10-20 04/17/2018 01:27 AM         Medications Reviewed:     Current Facility-Administered Medications   Medication Dose Route Frequency    0.9% sodium chloride infusion 250 mL  250 mL IntraVENous PRN    0.9% sodium chloride infusion  100 mL/hr IntraVENous CONTINUOUS    pantoprazole (PROTONIX) 40 mg in sodium chloride 0.9% 10 mL injection  40 mg IntraVENous Q12H    atenolol (TENORMIN) tablet 50 mg  50 mg Oral 7am    dronabinol (MARINOL) capsule 5 mg  5 mg Oral BID    HYDROcodone-acetaminophen (NORCO) 5-325 mg per tablet 1 Tab  1 Tab Oral Q4H PRN    levETIRAcetam (KEPPRA) tablet 500 mg  500 mg Oral BID    sodium chloride (NS) flush 5-10 mL  5-10 mL IntraVENous Q8H    sodium chloride (NS) flush 5-10 mL  5-10 mL IntraVENous PRN    ondansetron (ZOFRAN) injection 4 mg  4 mg IntraVENous Q4H PRN    calcium acetate (PHOSLO) tablet 667 mg  1 Tab Oral TID WITH MEALS    0.9% sodium chloride infusion 250 mL  250 mL IntraVENous PRN     Current Outpatient Prescriptions   Medication Sig    aspirin delayed-release 81 mg tablet Take 1 Tab by mouth daily.  atenolol (TENORMIN) 50 mg tablet Take 50 mg by mouth every morning.     levETIRAcetam (KEPPRA) 500 mg tablet Take 1 Tab by mouth two (2) times a day.     ______________________________________________________________________  EXPECTED LENGTH OF STAY: - - -  ACTUAL LENGTH OF STAY:          0                 Rexanne Gitelman, MD

## 2018-04-17 NOTE — CONSULTS
Patient name: Zulema Gomes MRN: [id number]      [facility]  Initial Consult Note  QVE:2/16/[birth year]67  DOS: 4/17/2018  Requested by: Dr. Yahaira Escobedo  Reason: Evaluation and management of JONNIE  Source: pt/dtr and chart review. 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 Agreeable. Had requested urinal before as per urology notes  Monitor IOs  Urology eval  K is coming down with medical Rx. Expect to improve further post warren  If his JONNIE does not improve and or gets worse, he will need HD but not a great candidate due to metastatic cancer  pts dtr also reports that her brother who is mPOA would also favour no dialysis  Repeat BMP in 6 hrs  Avoid nephrotoxins  Consider palliative eval for goals of care    HPI: Zulema Gomes is a 80 y.o. male with PMH significant for Prostate/mucoepidermoid Ca, HTN, Hx of CVA, Anemia>>send by PCP for abnl labs as outp. Elevated d-dimer as OP. Pt reports diarrhea for last few days, 3-4 BM/day, no blood. No n/v. Appetite OK  Found to have JONNIE with high K- s/p medical Rx  Imaging studies shows b/l hydronephrosis, incompletely distended bladder with thickening  No nsaids use. No recent IV dye. No hypotension  No skin rash    ROS- as above.  Rest deferred. Not very reliable. PMH:    Past Medical History:   Diagnosis Date    Arthritis     Cancer [facility]) 2004    malignant neoplasm of salivary glands    GERD (gastroesophageal reflux disease)     Hearing loss     bilateral    Heart failure (HCC)     HAS PACER    Hypercholesteremia     Hypertension     medicated    Other ill-defined conditions(799.89)     facial nerve disorder    Other ill-defined conditions(799.89)     enlarged prostate    Other ill-defined conditions(799.89)     tumor on parotid gland    Stroke ([address] 2005       PSH:  Past Surgical History:   Procedure Laterality Date    HX APPENDECTOMY      HX HEENT  5/2011    parotid gland surgery    HX ORTHOPAEDIC  1980's    Rotator cuff repair-RIGHT    HX OTHER SURGICAL      keloid removed left shoulder    HX PACEMAKER  2016    HX UROLOGICAL      prostate surgery       No Known Allergies      (Not in a hospital admission)    Social history:  Social History     Social History    Marital status:      Spouse name: N/A    Number of children: N/A    Years of education: N/A     Occupational History    Not on file. Social History Main Topics    Smoking status: Former Smoker     Packs/day: 1.00     Years: 20.00     Quit date: 5/18/1980    Smokeless tobacco: Never Used    Alcohol use No    Drug use: No    Sexual activity: Not on file     Other Topics Concern    Not on file     Social History Narrative       Physical Exam:  Visit Vitals    /53    Pulse 63    Temp 98.4 °F (36.9 °C)    Resp 11    Ht 5' 5\" (1.651 m)    Wt 57.2 kg (126 lb)    SpO2 98%    BMI 20.97 kg/m2     Elderly, appears stated age, in NAD  Pallor+, no icterus  Clear with dec BS at bases  RRR, no rub, no murmur  Soft, NT, BS+  Trace peripheral edema  No skin rash  Alert awake. [place]+.  No myoclonus    Labs/Data:   Lab Results   Component Value Date/Time    WBC 2.6 (L) 04/17/2018 06:58 AM    Hemoglobin (POC) 13.3 07/29/2011 11:54 AM    HGB 7.8 (L) 04/17/2018 06:58 AM    Hematocrit (POC) 18 (L) 04/17/2018 01:09 AM    HCT 24.8 (L) 04/17/2018 06:58 AM    PLATELET 98 (L) 21/88/2357 06:58 AM    .0 (H) 04/17/2018 06:58 AM       Lab Results   Component Value Date/Time    Sodium 141 04/17/2018 10:05 AM    Potassium 5.9 (H) 04/17/2018 10:05 AM    Chloride 111 (H) 04/17/2018 10:05 AM    CO2 20 (L) 04/17/2018 10:05 AM    Anion gap 10 04/17/2018 10:05 AM    Glucose 83 04/17/2018 10:05 AM    BUN 74 (H) 04/17/2018 10:05 AM    Creatinine 8.75 (H) 04/17/2018 10:05 AM    BUN/Creatinine ratio 8 (L) 04/17/2018 10:05 AM    GFR est AA 7 (L) 04/17/2018 10:05 AM    GFR est non-AA 6 (L) 04/17/2018 10:05 AM    Calcium 8.7 04/17/2018 10:05 AM       No intake or output data in the 24 hours ending 04/17/18 1116    Wt Readings from Last 3 Encounters:   04/16/18 57.2 kg (126 lb)   02/08/18 61.7 kg (136 lb 0.4 oz)   09/12/17 68 kg (150 lb)       Renal US: b/l hydro  UA: no protein or blood    Patient seen and examined. Chart reviewed.  Labs, data and other pertinent notes reviewed from admit  Discussed with pt/dtr    Signed by:  Vinh Colmenares MD  Nephrology and HTN

## 2018-04-17 NOTE — PROGRESS NOTES
Pt presented to ED after labs revealed elevated d-dimer. Pt reports bilateral leg swelling for 2 weeks. Urology consult written for bilateral hydronephrosis. US retroperitoneum 4/17/18    1. There is bilateral hydronephrosis. There are bilateral renal cysts. 2. Urinary bladder is incompletely distended. There is an enlarged prostate  gland with question of slight asymmetric enlargement of the right seminal  vesicle versus thickening urinary bladder wall posteriorly. CT w/o contrast 4/17/18  Cardiomegaly with small pericardial effusion, cardiac pacemaker. The bilateral  new mild sized pleural effusions. Liver and spleen are normal in size. The  gallbladder is not distended. The a pancreas appears unremarkable. There is no  definite adenopathy. The prostate is enlarged with calcification. The bladder  wall appears thickened posteriorly but the bladder is not distended. There is  anasarca. There is sigmoid diverticulosis. There is no bowel obstruction. There  is no free air or free fluid. There is bilateral right more than left  hydronephrosis and hydroureter. No definite calcification. Diffuse vascular  calcification without a focal aneurysm. Diffuse blastic metastases involving  most of the visualized skeleton. Impression      IMPRESSION: Cardiomegaly with bilateral effusions. Bilateral hydronephrosis. Diffuse bony metastases. Hx: Pacemaker,  prostate & mucoepidermoid cancer, gerd, SOB, stroke    CR 8.64  K 6.1      Warren ordered, however, pt asked to use the urinal.  Discussed with RN to hold warren catheter and monitor with bladder scans. Pt on contact for enteric c-diff. Will discuss consult with Dr. Jerris Ormond who saw patient last year.     Shukri Hair  RN,  Urology RN Coordinator  [phone]

## 2018-04-17 NOTE — PROGRESS NOTES
1830: Report received from ED, patient has not received 2 units of blood ordered at 0600. Dr. Zainab Stockton notified blood has not been given in ED. Dr. Zainab Stockton appreciative of notification and requests 2 units be given as soon as pt arrives to [facility].     1936: Pt received from ED with first unit of blood infusing. Family at bedside. Primary Nurse Austyn Dickerson. Consuelo Briceno, RN and Carlos Batres RN performed a dual skin assessment on this patient No impairment noted  Dominik score is 20    Bedside shift change report given to Soteira (oncoming nurse) by Rubens Song RN (offgoing nurse). Report included the following information SBAR, Kardex, ED Summary, Procedure Summary, Intake/Output, MAR, Accordion, Recent Results, Med Rec Status, Cardiac Rhythm NSR and Alarm Parameters .

## 2018-04-17 NOTE — PROGRESS NOTES
Admission Medication Reconciliation:    Information obtained from: patient    Significant PMH/Disease States:   Past Medical History:   Diagnosis Date    Arthritis     Cancer 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) 2005       Chief Complaint for this Admission:  SOB, lower extremity swelling    Allergies:  Review of patient's allergies indicates no known allergies. Prior to Admission Medications:   Prior to Admission Medications   Prescriptions Last Dose Informant Patient Reported? Taking?   aspirin delayed-release 81 mg tablet 4/16/2018 at Unknown time  No Yes   Sig: Take 1 Tab by mouth daily. atenolol (TENORMIN) 50 mg tablet 4/16/2018 at Unknown time  Yes Yes   Sig: Take 50 mg by mouth every morning. levETIRAcetam (KEPPRA) 500 mg tablet Unknown at Unknown time  No No   Sig: Take 1 Tab by mouth two (2) times a day. Facility-Administered Medications: None         Comments/Recommendations: Removed dronabinol and norco. Patient confirmed atenolol and aspirin. However, unable to determine if keppra is a current medication. Attempting to contact patient's pharmacy and will alert provider.

## 2018-04-17 NOTE — ED NOTES
Gave bedside report regarding, SBAR, MAR, and plan of care to Halima Sung RN. Transfered care of patient to RN.

## 2018-04-17 NOTE — ED NOTES
TRANSFER - OUT REPORT:    Verbal report given to ole zepeda(name) on Morgan Cardoso  being transferred to 421(unit) for routine progression of care       Report consisted of patients Situation, Background, Assessment and   Recommendations(SBAR). Information from the following report(s) SBAR and ED Summary was reviewed with the receiving nurse. Lines:   Peripheral IV 04/16/18 Left Antecubital (Active)   Site Assessment Clean, dry, & intact 4/16/2018 11:44 PM   Phlebitis Assessment 0 4/16/2018 11:44 PM   Infiltration Assessment 0 4/16/2018 11:44 PM   Dressing Status Clean, dry, & intact 4/16/2018 11:44 PM   Dressing Type Transparent 4/16/2018 11:44 PM   Hub Color/Line Status Green;Capped;Flushed;Patent 4/16/2018 11:44 PM   Action Taken Blood drawn 4/16/2018 11:44 PM        Opportunity for questions and clarification was provided.       Patient transported with:   Monitor  Registered Nurse

## 2018-04-18 LAB
ABO + RH BLD: NORMAL
ALBUMIN SERPL ELPH-MCNC: 2.8 G/DL (ref 2.9–4.4)
ALBUMIN/GLOB SERPL: 1.5 {RATIO} (ref 0.7–1.7)
ALPHA1 GLOB SERPL ELPH-MCNC: 0.2 G/DL (ref 0–0.4)
ALPHA2 GLOB SERPL ELPH-MCNC: 0.8 G/DL (ref 0.4–1)
ANION GAP SERPL CALC-SCNC: 12 MMOL/L (ref 5–15)
ARTERIAL PATENCY WRIST A: ABNORMAL
B-GLOBULIN SERPL ELPH-MCNC: 0.6 G/DL (ref 0.7–1.3)
BACTERIA SPEC CULT: NORMAL
BASE DEFICIT BLD-SCNC: 4 MMOL/L
BASOPHILS # BLD: 0 K/UL (ref 0–0.1)
BASOPHILS NFR BLD: 1 % (ref 0–1)
BDY SITE: ABNORMAL
BLD PROD TYP BPU: NORMAL
BLOOD GROUP ANTIBODIES SERPL: NORMAL
BPU ID: NORMAL
BUN SERPL-MCNC: 71 MG/DL (ref 6–20)
BUN/CREAT SERPL: 9 (ref 12–20)
C DIFF TOX GENS STL QL NAA+PROBE: NEGATIVE
CALCIUM SERPL-MCNC: 8.8 MG/DL (ref 8.5–10.1)
CC UR VC: NORMAL
CHLORIDE SERPL-SCNC: 110 MMOL/L (ref 97–108)
CO2 SERPL-SCNC: 20 MMOL/L (ref 21–32)
CREAT SERPL-MCNC: 8.34 MG/DL (ref 0.7–1.3)
CROSSMATCH RESULT,%XM: NORMAL
DIFFERENTIAL METHOD BLD: ABNORMAL
EOSINOPHIL # BLD: 0 K/UL (ref 0–0.4)
EOSINOPHIL NFR BLD: 0 % (ref 0–7)
ERYTHROCYTE [DISTWIDTH] IN BLOOD BY AUTOMATED COUNT: 19.6 % (ref 11.5–14.5)
GAMMA GLOB SERPL ELPH-MCNC: 0.3 G/DL (ref 0.4–1.8)
GAS FLOW.O2 O2 DELIVERY SYS: ABNORMAL L/MIN
GAS FLOW.O2 SETTING OXYMISER: 3 L/M
GLOBULIN SER CALC-MCNC: 1.9 G/DL (ref 2.2–3.9)
GLUCOSE BLD STRIP.AUTO-MCNC: 118 MG/DL (ref 65–100)
GLUCOSE BLD STRIP.AUTO-MCNC: 132 MG/DL (ref 65–100)
GLUCOSE BLD STRIP.AUTO-MCNC: 153 MG/DL (ref 65–100)
GLUCOSE BLD STRIP.AUTO-MCNC: 48 MG/DL (ref 65–100)
GLUCOSE BLD STRIP.AUTO-MCNC: 91 MG/DL (ref 65–100)
GLUCOSE SERPL-MCNC: 43 MG/DL (ref 65–100)
HCO3 BLD-SCNC: 21.3 MMOL/L (ref 22–26)
HCT VFR BLD AUTO: 25.8 % (ref 36.6–50.3)
HGB BLD-MCNC: 8.4 G/DL (ref 12.1–17)
IMM GRANULOCYTES # BLD: 0 K/UL
IMM GRANULOCYTES NFR BLD AUTO: 0 %
KAPPA LC FREE SER-MCNC: 24.5 MG/L (ref 3.3–19.4)
KAPPA LC FREE/LAMBDA FREE SER: 1.62 {RATIO} (ref 0.26–1.65)
LAMBDA LC FREE SERPL-MCNC: 15.1 MG/L (ref 5.7–26.3)
LEVETIRACETAM SERPL-MCNC: NORMAL UG/ML (ref 10–40)
LYMPHOCYTES # BLD: 1.6 K/UL (ref 0.8–3.5)
LYMPHOCYTES NFR BLD: 40 % (ref 12–49)
M PROTEIN SERPL ELPH-MCNC: 0.1 G/DL
MCH RBC QN AUTO: 31.2 PG (ref 26–34)
MCHC RBC AUTO-ENTMCNC: 32.6 G/DL (ref 30–36.5)
MCV RBC AUTO: 95.9 FL (ref 80–99)
METAMYELOCYTES NFR BLD MANUAL: 1 %
MONOCYTES # BLD: 0.5 K/UL (ref 0–1)
MONOCYTES NFR BLD: 12 % (ref 5–13)
MYELOCYTES NFR BLD MANUAL: 2 %
NEUTS BAND NFR BLD MANUAL: 3 % (ref 0–6)
NEUTS SEG # BLD: 1.7 K/UL (ref 1.8–8)
NEUTS SEG NFR BLD: 41 % (ref 32–75)
NRBC # BLD: 0.39 K/UL (ref 0–0.01)
NRBC BLD-RTO: 10 PER 100 WBC
PCO2 BLD: 38.3 MMHG (ref 35–45)
PH BLD: 7.35 [PH] (ref 7.35–7.45)
PHOSPHATE SERPL-MCNC: 4.9 MG/DL (ref 2.6–4.7)
PLATELET # BLD AUTO: 92 K/UL (ref 150–400)
PMV BLD AUTO: 10.5 FL (ref 8.9–12.9)
PO2 BLD: 89 MMHG (ref 80–100)
POTASSIUM SERPL-SCNC: 5.1 MMOL/L (ref 3.5–5.1)
PROT SERPL-MCNC: 4.7 G/DL (ref 6–8.5)
RBC # BLD AUTO: 2.69 M/UL (ref 4.1–5.7)
RBC MORPH BLD: ABNORMAL
RBC MORPH BLD: ABNORMAL
SAO2 % BLD: 96 % (ref 92–97)
SERVICE CMNT-IMP: ABNORMAL
SERVICE CMNT-IMP: NORMAL
SERVICE CMNT-IMP: NORMAL
SODIUM SERPL-SCNC: 142 MMOL/L (ref 136–145)
SPECIMEN EXP DATE BLD: NORMAL
SPECIMEN TYPE: ABNORMAL
STATUS OF UNIT,%ST: NORMAL
TOTAL RESP. RATE, ITRR: 18
UNIT DIVISION, %UDIV: 0
WBC # BLD AUTO: 3.9 K/UL (ref 4.1–11.1)
WBC #/AREA STL HPF: NORMAL /HPF (ref 0–4)

## 2018-04-18 PROCEDURE — 74011250637 HC RX REV CODE- 250/637: Performed by: HOSPITALIST

## 2018-04-18 PROCEDURE — 85025 COMPLETE CBC W/AUTO DIFF WBC: CPT

## 2018-04-18 PROCEDURE — 87045 FECES CULTURE AEROBIC BACT: CPT | Performed by: NURSE PRACTITIONER

## 2018-04-18 PROCEDURE — 74011000258 HC RX REV CODE- 258: Performed by: INTERNAL MEDICINE

## 2018-04-18 PROCEDURE — 36600 WITHDRAWAL OF ARTERIAL BLOOD: CPT

## 2018-04-18 PROCEDURE — 74011000250 HC RX REV CODE- 250: Performed by: INTERNAL MEDICINE

## 2018-04-18 PROCEDURE — 87493 C DIFF AMPLIFIED PROBE: CPT | Performed by: NURSE PRACTITIONER

## 2018-04-18 PROCEDURE — 74011250637 HC RX REV CODE- 250/637: Performed by: INTERNAL MEDICINE

## 2018-04-18 PROCEDURE — 87177 OVA AND PARASITES SMEARS: CPT

## 2018-04-18 PROCEDURE — 82962 GLUCOSE BLOOD TEST: CPT

## 2018-04-18 PROCEDURE — 82803 BLOOD GASES ANY COMBINATION: CPT

## 2018-04-18 PROCEDURE — 84100 ASSAY OF PHOSPHORUS: CPT | Performed by: INTERNAL MEDICINE

## 2018-04-18 PROCEDURE — 36415 COLL VENOUS BLD VENIPUNCTURE: CPT | Performed by: INTERNAL MEDICINE

## 2018-04-18 PROCEDURE — 65660000001 HC RM ICU INTERMED STEPDOWN

## 2018-04-18 PROCEDURE — 74011000250 HC RX REV CODE- 250

## 2018-04-18 PROCEDURE — 74011250636 HC RX REV CODE- 250/636: Performed by: INTERNAL MEDICINE

## 2018-04-18 PROCEDURE — 89055 LEUKOCYTE ASSESSMENT FECAL: CPT | Performed by: NURSE PRACTITIONER

## 2018-04-18 PROCEDURE — 80048 BASIC METABOLIC PNL TOTAL CA: CPT | Performed by: INTERNAL MEDICINE

## 2018-04-18 PROCEDURE — 87147 CULTURE TYPE IMMUNOLOGIC: CPT | Performed by: NURSE PRACTITIONER

## 2018-04-18 PROCEDURE — 74011636637 HC RX REV CODE- 636/637: Performed by: INTERNAL MEDICINE

## 2018-04-18 PROCEDURE — C9113 INJ PANTOPRAZOLE SODIUM, VIA: HCPCS | Performed by: INTERNAL MEDICINE

## 2018-04-18 RX ORDER — HYDRALAZINE HYDROCHLORIDE 20 MG/ML
10 INJECTION INTRAMUSCULAR; INTRAVENOUS ONCE
Status: COMPLETED | OUTPATIENT
Start: 2018-04-18 | End: 2018-04-18

## 2018-04-18 RX ORDER — DEXTROSE 50 % IN WATER (D50W) INTRAVENOUS SYRINGE
Status: COMPLETED
Start: 2018-04-18 | End: 2018-04-18

## 2018-04-18 RX ORDER — DEXTROSE 50 % IN WATER (D50W) INTRAVENOUS SYRINGE
25
Status: COMPLETED | OUTPATIENT
Start: 2018-04-18 | End: 2018-04-18

## 2018-04-18 RX ORDER — PANTOPRAZOLE SODIUM 40 MG/1
40 TABLET, DELAYED RELEASE ORAL
Status: DISCONTINUED | OUTPATIENT
Start: 2018-04-18 | End: 2018-04-19 | Stop reason: ALTCHOICE

## 2018-04-18 RX ADMIN — SODIUM POLYSTYRENE SULFONATE 30 G: 15 SUSPENSION ORAL; RECTAL at 00:00

## 2018-04-18 RX ADMIN — DEXTROSE MONOHYDRATE 25 G: 25 INJECTION, SOLUTION INTRAVENOUS at 05:40

## 2018-04-18 RX ADMIN — Medication 10 ML: at 06:00

## 2018-04-18 RX ADMIN — HUMAN INSULIN 10 UNITS: 100 INJECTION, SOLUTION SUBCUTANEOUS at 00:00

## 2018-04-18 RX ADMIN — ATENOLOL 25 MG: 25 TABLET ORAL at 07:56

## 2018-04-18 RX ADMIN — SODIUM CHLORIDE: 450 INJECTION, SOLUTION INTRAVENOUS at 10:00

## 2018-04-18 RX ADMIN — HYDRALAZINE HYDROCHLORIDE 10 MG: 20 INJECTION INTRAMUSCULAR; INTRAVENOUS at 22:57

## 2018-04-18 RX ADMIN — DEXTROSE MONOHYDRATE 25 G: 25 INJECTION, SOLUTION INTRAVENOUS at 05:30

## 2018-04-18 RX ADMIN — Medication 10 ML: at 22:06

## 2018-04-18 RX ADMIN — SODIUM CHLORIDE 40 MG: 9 INJECTION INTRAMUSCULAR; INTRAVENOUS; SUBCUTANEOUS at 03:30

## 2018-04-18 RX ADMIN — DEXTROSE 50 % IN WATER (D50W) INTRAVENOUS SYRINGE 25 G: at 05:30

## 2018-04-18 RX ADMIN — Medication 5 ML: at 14:00

## 2018-04-18 RX ADMIN — DEXTROSE MONOHYDRATE 25 G: 25 INJECTION, SOLUTION INTRAVENOUS at 00:11

## 2018-04-18 NOTE — PROGRESS NOTES
Mr. Jerardo Alvarado is comfortable with no active bleeding. I do not plan any further evaluation from a GI standpoint. Please call if we can be of assistance, thanks.

## 2018-04-18 NOTE — PROGRESS NOTES
2200  Bp elevated while received blood transfusion. Lungs crackles upon assessment, 3+ LE noted. Paged Dr. Shirin Ferrer. Received orders for 1x dose of lasix. Patient Vitals for the past 4 hrs:   Temp Pulse Resp BP SpO2   04/17/18 2330 98.2 °F (36.8 °C) 68 15 150/59 96 %   04/17/18 2300 98.1 °F (36.7 °C) 70 15 (!) 141/112 95 %   04/17/18 2245 98.8 °F (37.1 °C) 73 12 183/67 98 %   04/17/18 2230 98.3 °F (36.8 °C) 69 16 (!) 174/94 98 %   04/17/18 2226 98.3 °F (36.8 °C) 69 18 177/67 97 %   04/17/18 2202 98.7 °F (37.1 °C) 81 13 172/80 99 %   04/17/18 2130 98.2 °F (36.8 °C) 77 16 174/67 98 %       2300 lung sounds coarse, 1+ to 2+ edema, BP only slightly elevated. Will continue to monitor      2330 BMP results came in,reported results to Dr. Shirin Ferrer of K+ 5.9 . Orders received. 0130 2 units of blood completed. Upon assessment Rt ac IV infiltrated with a hematoma appearance. Applied warm compress and elevated arm with pillows. Will continue to monitor. 0500 Pt currently pursed lip breathing, lung sounds coarse, pt O2 sats dropped into the mid 70's. Assured pt to take deep breaths, applied 2L O2 on pt.  Notified Dr. Shirin Ferrer, received orders for ABG

## 2018-04-18 NOTE — PROGRESS NOTES
Problem: Falls - Risk of  Goal: *Absence of Falls  Document Dalia Fall Risk and appropriate interventions in the flowsheet.    Outcome: Progressing Towards Goal  Fall Risk Interventions:  Mobility Interventions: Patient to call before getting OOB, Communicate number of staff needed for ambulation/transfer, PT Consult for mobility concerns, PT Consult for assist device competence, Strengthening exercises (ROM-active/passive)              Elimination Interventions: Call light in reach, Elevated toilet seat, Patient to call for help with toileting needs, Toileting schedule/hourly rounds

## 2018-04-18 NOTE — DIABETES MGMT
DTC Progress Note    Recommendations/ Comments: Chart reviewed on [facility] for hypoglycemia (43 mg/dl @ 4:13 this am) secondary to regular insulin given for elevated potassium. If appropriate, please consider checking blood sugars and adding D5 if blood sugars <70 mg/dL. Spoke with nurse regarding above. Current hospital DM medication: none scheduled, but received Regular insulin x2 for elevated potassium    Patient is a 80 y.o. male with no noted history of diabetes. A1c:   Lab Results   Component Value Date/Time    Hemoglobin A1c 5.9 02/07/2018 07:22 AM       Recent Glucose Results:   Lab Results   Component Value Date/Time    GLU 43 (LL) 04/18/2018 04:13 AM    GLU 96 04/17/2018 08:07 PM    GLU 83 04/17/2018 10:05 AM    GLUCPOC 153 (H) 04/18/2018 05:37 AM    GLUCPOC 48 (LL) 04/18/2018 05:00 AM        Lab Results   Component Value Date/Time    Creatinine 8.34 (H) 04/18/2018 04:13 AM     Estimated Creatinine Clearance: 5.8 mL/min (based on Cr of 8.34). Active Orders   Diet    DIET NPO        PO intake: No data found. Will continue to follow as needed.     Thank you  Marzena Holman, MS, RN, CDE

## 2018-04-18 NOTE — PROGRESS NOTES
Clinical Pharmacy Note: IV to PO Automatic Conversion  Please note: Paradise Valenzuela medication- Pantoprazole has been changed from IV to PO based on the following critiera:    - Patient is taking scheduled oral medications  - Patient is tolerating tube feeds at goal rate or a full liquid, soft or regular diet    This IV to PO conversion is based on the [employer] approved automatic conversion policy for eligible patients. Please call with questions.

## 2018-04-18 NOTE — PROGRESS NOTES
Hospitalist Progress Note  Tay Garza MD  Answering service: [phone] OR [phone] from in house phone      Date of Service:  2018  NAME:  Betty Marie  :  1935  MRN:  [id number]      Admission Summary:     HPI Comments: 80 y.o. male with past medical history significant for pacemaker, GERD, HTN, Stroke, Cancer, Appendectomy who presents from home accompanied by family with chief complaint of leg swelling. Per family, pt attended an appointment with his primary care today for symptoms of leg swelling and diarrhea over the past couple of days. Consistency and color of stool unknown. While at PCP, pt had blood work performed. Family received a call this evening stating that he should be evaluated in ED for \"being high risk for a blood clot\" due to an elevated D-dimer. While in Ed, pt deneis any pain. He has a pacemaker in place for unexplained syncope. He also is a patient with cancer (prostate and mucoepidermoid). He has had radiation and surgery in the past for his cancer but has declined further treatment. Interval history / Subjective:       No acute complaint. Assessment & Plan:     Acute renal failure  Likely from prerenal cause from volume depletion from diarrhea. Also post obstructive from bladder outlet obstruction. Nephrology on board. IVF hydration and warren. Family does not wish patient to have dialysis. Palliative care consult to be considered after discussion with family. Metabolic acidosis  Mild, from renal failure, on bicarb drip. Hyperkalemia  Now resolved. Bilateral hydronephrosis  Urology evaluated the patient, now with Warren cath. Outpatient urology follow up. 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 Will consider palliative care after discussion with family. Anemia  H&H stable. No clinical signs and symptoms of bleeding. GI evaluated the patient.  No further work up planned. Hypertension  Stable on current meds. Code status: Full  DVT prophylaxis: SCDs    Care Plan discussed with: Patient/Family  Disposition: TBD     Hospital Problems  Date Reviewed: 4/17/2018          Codes Class Noted POA    * (Principal)Acute renal failure (ARF) ([facility] [address] ICD-10-CM: N17.9  ICD-9-CM: 584.9  4/17/2018 Yes                Review of Systems:   A comprehensive review of systems was negative except for that written in the HPI. Vital Signs:    Last 24hrs VS reviewed since prior progress note. Most recent are:  Visit Vitals    /47 (BP 1 Location: Left arm, BP Patient Position: At rest)    Pulse 66    Temp 98.3 °F (36.8 °C)    Resp 13    Ht 5' 5\" (1.651 m)    Wt 61.3 kg (135 lb 2.3 oz)    SpO2 100%    BMI 22.49 kg/m2         Intake/Output Summary (Last 24 hours) at 04/18/18 1348  Last data filed at 04/18/18 0801   Gross per 24 hour   Intake            610.1 ml   Output             1885 ml   Net          -1274.9 ml        Physical Examination:             Constitutional:  No acute distress, cooperative, pleasant    ENT:  Oral mucous moist, oropharynx benign. Neck supple,    Resp:  CTA bilaterally. No wheezing/rhonchi/rales. No accessory muscle use   CV:  Regular rhythm, normal rate, no murmurs, gallops, rubs    GI:  Soft, non distended, non tender. normoactive bowel sounds, no hepatosplenomegaly     Musculoskeletal:  No edema, warm, 2+ pulses throughout    Neurologic:  Moves all extremities.   AAOx3, CN II-XII reviewed     Skin:  Good turgor, no rashes or ulcers       Data Review:    Review and/or order of clinical lab test      Labs:     Recent Labs      04/18/18   0521  04/17/18   0658   WBC  3.9*  2.6*   HGB  8.4*  7.8*   HCT  25.8*  24.8*   PLT  92*  98*     Recent Labs      04/18/18   0413  04/17/18 2007 04/17/18   1005   04/16/18   2341   NA  142  141  141   < >  137   K  5.1  5.8*  5.9*   < >  6.8*   CL  110*  109*  111*   < >  106   CO2  20*  21  20*   < >  20*   BUN 71*  74*  74*   < >  74*   CREA  8.34*  8.60*  8.75*   < >  8.51*   GLU  43*  96  83   < >  94   CA  8.8  8.7  8.7   < >  8.6   MG   --    --    --    --   2.0   PHOS  4.9*   --    --    --   5.0*    < > = values in this interval not displayed. Recent Labs      04/17/18   0658  04/16/18   2341   SGOT  15  15   ALT  8*  8*   AP  358*  381*   TBILI  0.4  0.4   TP  5.5*  5.8*   ALB  2.2*  2.4*   GLOB  3.3  3.4   AML   --   45   LPSE   --   181     Recent Labs      04/16/18   2341   INR  1.1   PTP  11.5*   APTT  23.1      Recent Labs      04/17/18   1307   TIBC  175*   PSAT  50   FERR  1044*      Lab Results   Component Value Date/Time    Folate 14.5 04/17/2018 01:07 PM      No results for input(s): PH, PCO2, PO2 in the last 72 hours.   Recent Labs      04/17/18   0658  04/16/18   2341   CPK  51   --    CKNDX  3.5*   --    TROIQ  <0.04  <0.04     Lab Results   Component Value Date/Time    Cholesterol, total 112 04/17/2018 06:58 AM    HDL Cholesterol 39 04/17/2018 06:58 AM    LDL, calculated 47.8 04/17/2018 06:58 AM    Triglyceride 126 04/17/2018 06:58 AM    CHOL/HDL Ratio 2.9 04/17/2018 06:58 AM     Lab Results   Component Value Date/Time    Glucose (POC) 91 04/18/2018 11:25 AM    Glucose (POC) 153 (H) 04/18/2018 05:37 AM    Glucose (POC) 48 (LL) 04/18/2018 05:00 AM    Glucose (POC) 88 04/17/2018 01:09 AM    Glucose (POC) 101 (H) 02/09/2018 07:17 AM    Glucose (POC) 132 (H) 02/08/2018 09:42 PM     Lab Results   Component Value Date/Time    Color STRAW 04/17/2018 01:27 AM    Appearance CLEAR 04/17/2018 01:27 AM    Specific gravity 1.010 04/17/2018 01:27 AM    pH (UA) 6.5 04/17/2018 01:27 AM    Protein NEGATIVE  04/17/2018 01:27 AM    Glucose NEGATIVE  04/17/2018 01:27 AM    Ketone NEGATIVE  04/17/2018 01:27 AM    Bilirubin NEGATIVE  04/17/2018 01:27 AM    Urobilinogen 0.2 04/17/2018 01:27 AM    Nitrites NEGATIVE  04/17/2018 01:27 AM    Leukocyte Esterase NEGATIVE  04/17/2018 01:27 AM    Epithelial cells FEW 04/17/2018 01:27 AM    Bacteria NEGATIVE  04/17/2018 01:27 AM    WBC 10-20 04/17/2018 01:27 AM    RBC 10-20 04/17/2018 01:27 AM         Medications Reviewed:     Current Facility-Administered Medications   Medication Dose Route Frequency    0.9% sodium chloride infusion 250 mL  250 mL IntraVENous PRN    pantoprazole (PROTONIX) 40 mg in sodium chloride 0.9% 10 mL injection  40 mg IntraVENous Q12H    dronabinol (MARINOL) capsule 5 mg  5 mg Oral BID    HYDROcodone-acetaminophen (NORCO) 5-325 mg per tablet 1 Tab  1 Tab Oral Q4H PRN    levETIRAcetam (KEPPRA) tablet 500 mg  500 mg Oral BID    sodium chloride (NS) flush 5-10 mL  5-10 mL IntraVENous Q8H    sodium chloride (NS) flush 5-10 mL  5-10 mL IntraVENous PRN    ondansetron (ZOFRAN) injection 4 mg  4 mg IntraVENous Q4H PRN    calcium acetate (PHOSLO) tablet 667 mg  1 Tab Oral TID WITH MEALS    0.9% sodium chloride infusion 250 mL  250 mL IntraVENous PRN    atenolol (TENORMIN) tablet 25 mg  25 mg Oral 7am    sodium bicarbonate (8.4%) 75 mEq in 0.45% sodium chloride 1,000 mL infusion   IntraVENous CONTINUOUS    influenza vaccine 2017-18 (3 yrs+)(PF) (FLUZONE QUAD/FLUARIX QUAD) injection 0.5 mL  0.5 mL IntraMUSCular PRIOR TO DISCHARGE    epoetin catrachita (EPOGEN;PROCRIT) injection 10,000 Units  10,000 Units SubCUTAneous Q TUE, THU & SAT     ______________________________________________________________________  EXPECTED LENGTH OF STAY: 2d 7h  ACTUAL LENGTH OF STAY:          1                 Rosa Paris MD

## 2018-04-18 NOTE — PROGRESS NOTES
HYPOGLYCEMIC EPISODE DOCUMENTATION    Patient with hypoglycemic episode(s) at 0500(time) on 4/18/18(date). BG value(s) pre-treatment 50    Was patient symptomatic?  [x] yes, [] no  Patient was treated with the following rescue medications/treatments: [x] D50                [] Glucose tablets                [] Glucagon                [] 4oz juice                [] 6oz reg soda                [] 8oz low fat milk  BG value post-treatment: 153  Once BG treated and value greater than 80mg/dl, pt was provided with the following: none pt NPO  [] snack  [] meal  Name of MD notified:yes  The following orders were received: d50

## 2018-04-18 NOTE — PROGRESS NOTES
Reason for Admission:   ARF, low leg pain                  RRAT Score:     16             Do you (patient/family) have any concerns for transition/discharge? Pt's daughter, Jung Andrade, [phone], and her brother want Palliative and or Hospice consult. The current plan is for pt to return home. He has 2 aide caregivers. Pt uses a walker at home. Plan for utilizing home health:   Pt's family considering Hospice but unsure at this time. He has not used home health in past.    Likelihood of readmission?    Moderate            Transition of Care Plan:   As above       Sunday Morales RN ACM CRM

## 2018-04-18 NOTE — PROGRESS NOTES
Name: Camron Colunga   MRN: [id number]  : 1935      Assessment:  JNONIE- likely pre-renal from diarrhea and obstruction  B/l hydronephrosis- likely from bladder outlet obstruction  Hyperkalemia  Acidosis  Hx of Prostate and mucoepidermoid cancer- now with bone mets noted on CT scan   HTN  Hx of stroke     JONNIE marginally improved. But good UOP post warren catheter. High K resolved. Acidosis is mild. 1.7 L UOP overnight    Plan/Recommendations:  ct isotonic HCO3 drip at 100 ml/hr; watch for any post obstructive diuresis  No need for dialysis. Even if he needs it, pt and family does not want dialysis  He is aware of the consequences of death without HD, if and when indicated  He is also not a good  HD  candidate due to metastatic cancer  Avoid nephrotoxins  Consider palliative eval for goals of care and ?possible hospice  AM labs    Subjective:  Resting.  Denies acute c/o    ROS:   No nausea, no vomiting  No chest pain, no shortness of breath    Exam:  Visit Vitals    /55    Pulse 62    Temp 97.5 °F (36.4 °C)    Resp 17    Ht 5' 5\" (1.651 m)    Wt 61.3 kg (135 lb 2.3 oz)    SpO2 100%    BMI 22.49 kg/m2       Ill appearing in NAD  L facial droop  Clear ant/lat  RRR, no rub  Soft, NT  Warren+  No edema    Current Facility-Administered Medications   Medication Dose Route Frequency Last Dose    0.9% sodium chloride infusion 250 mL  250 mL IntraVENous PRN      pantoprazole (PROTONIX) 40 mg in sodium chloride 0.9% 10 mL injection  40 mg IntraVENous Q12H 40 mg at 18 0330    dronabinol (MARINOL) capsule 5 mg  5 mg Oral BID Stopped at 18 0900    HYDROcodone-acetaminophen (NORCO) 5-325 mg per tablet 1 Tab  1 Tab Oral Q4H PRN      levETIRAcetam (KEPPRA) tablet 500 mg  500 mg Oral BID Stopped at 18 0900    sodium chloride (NS) flush 5-10 mL  5-10 mL IntraVENous Q8H 10 mL at 04/18/18 0600    sodium chloride (NS) flush 5-10 mL  5-10 mL IntraVENous PRN      ondansetron (ZOFRAN) injection 4 mg  4 mg IntraVENous Q4H PRN      calcium acetate (PHOSLO) tablet 667 mg  1 Tab Oral TID WITH MEALS Stopped at 04/18/18 0800    0.9% sodium chloride infusion 250 mL  250 mL IntraVENous PRN      atenolol (TENORMIN) tablet 25 mg  25 mg Oral 7am 25 mg at 04/18/18 0756    sodium bicarbonate (8.4%) 75 mEq in 0.45% sodium chloride 1,000 mL infusion   IntraVENous CONTINUOUS      influenza vaccine 2017-18 (3 yrs+)(PF) (FLUZONE QUAD/FLUARIX QUAD) injection 0.5 mL  0.5 mL IntraMUSCular PRIOR TO DISCHARGE      epoetin catrachita (EPOGEN;PROCRIT) injection 10,000 Units  10,000 Units SubCUTAneous Q TUE, THU & SAT 10,000 Units at 04/17/18 2100       Labs/Data:    Lab Results   Component Value Date/Time    WBC 3.9 (L) 04/18/2018 05:21 AM    Hemoglobin (POC) 13.3 07/29/2011 11:54 AM    HGB 8.4 (L) 04/18/2018 05:21 AM    Hematocrit (POC) 18 (L) 04/17/2018 01:09 AM    HCT 25.8 (L) 04/18/2018 05:21 AM    PLATELET 92 (L) 02/78/9494 05:21 AM    MCV 95.9 04/18/2018 05:21 AM       Lab Results   Component Value Date/Time    Sodium 142 04/18/2018 04:13 AM    Potassium 5.1 04/18/2018 04:13 AM    Chloride 110 (H) 04/18/2018 04:13 AM    CO2 20 (L) 04/18/2018 04:13 AM    Anion gap 12 04/18/2018 04:13 AM    Glucose 43 (LL) 04/18/2018 04:13 AM    BUN 71 (H) 04/18/2018 04:13 AM    Creatinine 8.34 (H) 04/18/2018 04:13 AM    BUN/Creatinine ratio 9 (L) 04/18/2018 04:13 AM    GFR est AA 7 (L) 04/18/2018 04:13 AM    GFR est non-AA 6 (L) 04/18/2018 04:13 AM    Calcium 8.8 04/18/2018 04:13 AM       Wt Readings from Last 3 Encounters:   04/18/18 61.3 kg (135 lb 2.3 oz)   02/08/18 61.7 kg (136 lb 0.4 oz)   09/12/17 68 kg (150 lb)         Intake/Output Summary (Last 24 hours) at 04/18/18 1023  Last data filed at 04/18/18 0801   Gross per 24 hour   Intake            610.1 ml   Output             1935 ml   Net -1324.9 ml       Patient seen and examined. Chart reviewed. Labs, data and other pertinent notes reviewed in last 24 hrs.     PMH/SH/FH reviewed and unchanged compared to H&P    Discussed with pt and GI      Roge Ferro MD

## 2018-04-19 ENCOUNTER — HOSPICE ADMISSION (OUTPATIENT)
Dept: HOSPICE | Facility: HOSPICE | Age: 83
End: 2018-04-19

## 2018-04-19 LAB
ANION GAP SERPL CALC-SCNC: 12 MMOL/L (ref 5–15)
BASOPHILS # BLD: 0 K/UL (ref 0–0.1)
BASOPHILS NFR BLD: 0 % (ref 0–1)
BUN SERPL-MCNC: 72 MG/DL (ref 6–20)
BUN/CREAT SERPL: 9 (ref 12–20)
CALCIUM SERPL-MCNC: 8.4 MG/DL (ref 8.5–10.1)
CHLORIDE SERPL-SCNC: 107 MMOL/L (ref 97–108)
CO2 SERPL-SCNC: 22 MMOL/L (ref 21–32)
CREAT SERPL-MCNC: 7.9 MG/DL (ref 0.7–1.3)
DIFFERENTIAL METHOD BLD: ABNORMAL
EOSINOPHIL # BLD: 0 K/UL (ref 0–0.4)
EOSINOPHIL NFR BLD: 2 % (ref 0–7)
ERYTHROCYTE [DISTWIDTH] IN BLOOD BY AUTOMATED COUNT: 19.6 % (ref 11.5–14.5)
GLUCOSE BLD STRIP.AUTO-MCNC: 133 MG/DL (ref 65–100)
GLUCOSE BLD STRIP.AUTO-MCNC: 85 MG/DL (ref 65–100)
GLUCOSE BLD STRIP.AUTO-MCNC: 98 MG/DL (ref 65–100)
GLUCOSE SERPL-MCNC: 75 MG/DL (ref 65–100)
HCT VFR BLD AUTO: 28.7 % (ref 36.6–50.3)
HGB BLD-MCNC: 9.4 G/DL (ref 12.1–17)
IMM GRANULOCYTES # BLD: 0 K/UL
IMM GRANULOCYTES NFR BLD AUTO: 0 %
LYMPHOCYTES # BLD: 0.6 K/UL (ref 0.8–3.5)
LYMPHOCYTES NFR BLD: 29 % (ref 12–49)
MCH RBC QN AUTO: 31.4 PG (ref 26–34)
MCHC RBC AUTO-ENTMCNC: 32.8 G/DL (ref 30–36.5)
MCV RBC AUTO: 96 FL (ref 80–99)
MONOCYTES # BLD: 0.4 K/UL (ref 0–1)
MONOCYTES NFR BLD: 17 % (ref 5–13)
MYELOCYTES NFR BLD MANUAL: 1 %
NEUTS BAND NFR BLD MANUAL: 5 % (ref 0–6)
NEUTS SEG # BLD: 1.1 K/UL (ref 1.8–8)
NEUTS SEG NFR BLD: 46 % (ref 32–75)
NRBC # BLD: 0.17 K/UL (ref 0–0.01)
NRBC BLD-RTO: 7.7 PER 100 WBC
PLATELET # BLD AUTO: 82 K/UL (ref 150–400)
PMV BLD AUTO: 9.8 FL (ref 8.9–12.9)
POTASSIUM SERPL-SCNC: 4.4 MMOL/L (ref 3.5–5.1)
RBC # BLD AUTO: 2.99 M/UL (ref 4.1–5.7)
RBC MORPH BLD: ABNORMAL
SERVICE CMNT-IMP: ABNORMAL
SERVICE CMNT-IMP: NORMAL
SERVICE CMNT-IMP: NORMAL
SODIUM SERPL-SCNC: 141 MMOL/L (ref 136–145)
WBC # BLD AUTO: 2.2 K/UL (ref 4.1–11.1)

## 2018-04-19 PROCEDURE — 85025 COMPLETE CBC W/AUTO DIFF WBC: CPT | Performed by: FAMILY MEDICINE

## 2018-04-19 PROCEDURE — 82962 GLUCOSE BLOOD TEST: CPT

## 2018-04-19 PROCEDURE — 74011250637 HC RX REV CODE- 250/637: Performed by: HOSPITALIST

## 2018-04-19 PROCEDURE — 74011000250 HC RX REV CODE- 250: Performed by: INTERNAL MEDICINE

## 2018-04-19 PROCEDURE — 65270000032 HC RM SEMIPRIVATE

## 2018-04-19 PROCEDURE — 76450000000

## 2018-04-19 PROCEDURE — 74011000258 HC RX REV CODE- 258: Performed by: INTERNAL MEDICINE

## 2018-04-19 PROCEDURE — 80048 BASIC METABOLIC PNL TOTAL CA: CPT | Performed by: INTERNAL MEDICINE

## 2018-04-19 PROCEDURE — 74011250636 HC RX REV CODE- 250/636: Performed by: FAMILY MEDICINE

## 2018-04-19 PROCEDURE — 36415 COLL VENOUS BLD VENIPUNCTURE: CPT | Performed by: INTERNAL MEDICINE

## 2018-04-19 PROCEDURE — 74011250636 HC RX REV CODE- 250/636: Performed by: INTERNAL MEDICINE

## 2018-04-19 PROCEDURE — 74011250637 HC RX REV CODE- 250/637: Performed by: FAMILY MEDICINE

## 2018-04-19 RX ORDER — SODIUM CHLORIDE 9 MG/ML
75 INJECTION, SOLUTION INTRAVENOUS CONTINUOUS
Status: DISCONTINUED | OUTPATIENT
Start: 2018-04-19 | End: 2018-04-20 | Stop reason: HOSPADM

## 2018-04-19 RX ORDER — HYDRALAZINE HYDROCHLORIDE 20 MG/ML
10 INJECTION INTRAMUSCULAR; INTRAVENOUS
Status: DISCONTINUED | OUTPATIENT
Start: 2018-04-19 | End: 2018-04-20 | Stop reason: HOSPADM

## 2018-04-19 RX ADMIN — Medication 10 ML: at 05:05

## 2018-04-19 RX ADMIN — SODIUM CHLORIDE 75 ML/HR: 900 INJECTION, SOLUTION INTRAVENOUS at 12:00

## 2018-04-19 RX ADMIN — Medication 10 ML: at 22:00

## 2018-04-19 RX ADMIN — SODIUM CHLORIDE: 450 INJECTION, SOLUTION INTRAVENOUS at 05:04

## 2018-04-19 RX ADMIN — HYDRALAZINE HYDROCHLORIDE 10 MG: 20 INJECTION INTRAMUSCULAR; INTRAVENOUS at 19:58

## 2018-04-19 RX ADMIN — Medication 5 ML: at 14:00

## 2018-04-19 RX ADMIN — PANTOPRAZOLE SODIUM 40 MG: 40 TABLET, DELAYED RELEASE ORAL at 06:58

## 2018-04-19 RX ADMIN — ATENOLOL 25 MG: 25 TABLET ORAL at 06:58

## 2018-04-19 NOTE — PROGRESS NOTES
Bedside and Verbal shift change report given to [facility] (oncoming nurse) by Yosvany Kong (offgoing nurse). Report included the following information SBAR, ED Summary, Procedure Summary, MAR, Accordion, Med Rec Status and Cardiac Rhythm NSR.

## 2018-04-19 NOTE — PROGRESS NOTES
Hospitalist Progress Note  Dillan Mdconough MD  Answering service: [phone] OR [phone] from in house phone      Date of Service:  2018  NAME:  Gene Carlson  :  1935  MRN:  [id number]      Admission Summary:     HPI Comments: 83 y.o. male with past medical history significant for pacemaker, GERD, HTN, Stroke, Cancer, Appendectomy who presents from home accompanied by family with chief complaint of leg swelling. Per family, pt attended an appointment with his primary care today for symptoms of leg swelling and diarrhea over the past couple of days. Consistency and color of stool unknown. While at PCP, pt had blood work performed. Family received a call this evening stating that he should be evaluated in ED for \"being high risk for a blood clot\" due to an elevated D-dimer. While in Ed, pt deneis any pain. He has a pacemaker in place for unexplained syncope. He also is a patient with cancer (prostate and mucoepidermoid). He has had radiation and surgery in the past for his cancer but has declined further treatment. Interval history / Subjective:       No acute complaint. Assessment & Plan:     Acute renal failure  Likely from prerenal cause from volume depletion from diarrhea. Also post obstructive from bladder outlet obstruction. Nephrology on board. IVF hydration and warren. Family does not wish patient to have dialysis. Patient and family agreeable to proceed with hospice. Appreciate palliative evaluating the patient. Case management working for home hospice.     Metabolic acidosis  Mild, from renal failure, on bicarb drip.     Hyperkalemia  Now resolved.     Bilateral hydronephrosis  Urology evaluated the patient, now with Warren cath. Outpatient urology follow up.     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 For home with hospice.     Anemia  H&H stable. No clinical signs and symptoms of bleeding.  GI evaluated the patient. No further work up planned.     Hypertension  Stable on current meds.     Code status: Full  DVT prophylaxis: SCDs     Care Plan discussed with: Patient/Family  Disposition: TBD     Hospital Problems  Date Reviewed: 4/17/2018          Codes Class Noted POA    * (Principal)Acute renal failure (ARF) ([facility] [address] ICD-10-CM: N17.9  ICD-9-CM: 584.9  4/17/2018 Yes                Review of Systems:   A comprehensive review of systems was negative except for that written in the HPI. Vital Signs:    Last 24hrs VS reviewed since prior progress note. Most recent are:  Visit Vitals    /56    Pulse 62    Temp 98.1 °F (36.7 °C)    Resp 16    Ht 5' 5\" (1.651 m)    Wt 62.4 kg (137 lb 9.1 oz)    SpO2 99%    BMI 22.89 kg/m2         Intake/Output Summary (Last 24 hours) at 04/19/18 1408  Last data filed at 04/19/18 1041   Gross per 24 hour   Intake          1808.33 ml   Output             1375 ml   Net           433.33 ml        Physical Examination:             Constitutional:  No acute distress, cooperative, pleasant    ENT:  Oral mucous moist, oropharynx benign. Neck supple,    Resp:  CTA bilaterally. No wheezing/rhonchi/rales. No accessory muscle use   CV:  Regular rhythm, normal rate, no murmurs, gallops, rubs    GI:  Soft, non distended, non tender. normoactive bowel sounds, no hepatosplenomegaly     Musculoskeletal:  No edema, warm, 2+ pulses throughout    Neurologic:  Moves all extremities.   AAOx3, CN II-XII reviewed     Skin:  Good turgor, no rashes or ulcers       Data Review:    Review and/or order of clinical lab test      Labs:     Recent Labs      04/19/18   0333  04/18/18   0521   WBC  2.2*  3.9*   HGB  9.4*  8.4*   HCT  28.7*  25.8*   PLT  82*  92*     Recent Labs      04/19/18   0333  04/18/18   0413  04/17/18 2007 04/16/18   2341   NA  141  142  141   < >  137   K  4.4  5.1  5.8*   < >  6.8*   CL  107  110*  109*   < >  106   CO2  22  20*  21   < >  20*   BUN  72*  71*  74*   < >  74*   CREA 7.90*  8.34*  8.60*   < >  8.51*   GLU  75  43*  96   < >  94   CA  8.4*  8.8  8.7   < >  8.6   MG   --    --    --    --   2.0   PHOS   --   4.9*   --    --   5.0*    < > = values in this interval not displayed. Recent Labs      04/17/18   1307  04/17/18   0658  04/16/18   2341   SGOT   --   15  15   ALT   --   8*  8*   AP   --   358*  381*   TBILI   --   0.4  0.4   TP  4.7*  5.5*  5.8*   ALB   --   2.2*  2.4*   GLOB   --   3.3  3.4   AML   --    --   45   LPSE   --    --   181     Recent Labs      04/16/18   2341   INR  1.1   PTP  11.5*   APTT  23.1      Recent Labs      04/17/18   1307   TIBC  175*   PSAT  50   FERR  1044*      Lab Results   Component Value Date/Time    Folate 14.5 04/17/2018 01:07 PM      No results for input(s): PH, PCO2, PO2 in the last 72 hours.   Recent Labs      04/17/18   0658  04/16/18   2341   CPK  51   --    CKNDX  3.5*   --    TROIQ  <0.04  <0.04     Lab Results   Component Value Date/Time    Cholesterol, total 112 04/17/2018 06:58 AM    HDL Cholesterol 39 04/17/2018 06:58 AM    LDL, calculated 47.8 04/17/2018 06:58 AM    Triglyceride 126 04/17/2018 06:58 AM    CHOL/HDL Ratio 2.9 04/17/2018 06:58 AM     Lab Results   Component Value Date/Time    Glucose (POC) 133 (H) 04/19/2018 12:13 PM    Glucose (POC) 85 04/19/2018 06:52 AM    Glucose (POC) 118 (H) 04/18/2018 09:35 PM    Glucose (POC) 132 (H) 04/18/2018 05:02 PM    Glucose (POC) 91 04/18/2018 11:25 AM     Lab Results   Component Value Date/Time    Color STRAW 04/17/2018 01:27 AM    Appearance CLEAR 04/17/2018 01:27 AM    Specific gravity 1.010 04/17/2018 01:27 AM    pH (UA) 6.5 04/17/2018 01:27 AM    Protein NEGATIVE  04/17/2018 01:27 AM    Glucose NEGATIVE  04/17/2018 01:27 AM    Ketone NEGATIVE  04/17/2018 01:27 AM    Bilirubin NEGATIVE  04/17/2018 01:27 AM    Urobilinogen 0.2 04/17/2018 01:27 AM    Nitrites NEGATIVE  04/17/2018 01:27 AM    Leukocyte Esterase NEGATIVE  04/17/2018 01:27 AM    Epithelial cells FEW 04/17/2018 01:27 AM    Bacteria NEGATIVE  04/17/2018 01:27 AM    WBC 10-20 04/17/2018 01:27 AM    RBC 10-20 04/17/2018 01:27 AM         Medications Reviewed:     Current Facility-Administered Medications   Medication Dose Route Frequency    0.9% sodium chloride infusion  75 mL/hr IntraVENous CONTINUOUS    0.9% sodium chloride infusion 250 mL  250 mL IntraVENous PRN    dronabinol (MARINOL) capsule 5 mg  5 mg Oral BID    HYDROcodone-acetaminophen (NORCO) 5-325 mg per tablet 1 Tab  1 Tab Oral Q4H PRN    sodium chloride (NS) flush 5-10 mL  5-10 mL IntraVENous Q8H    sodium chloride (NS) flush 5-10 mL  5-10 mL IntraVENous PRN    ondansetron (ZOFRAN) injection 4 mg  4 mg IntraVENous Q4H PRN    calcium acetate (PHOSLO) tablet 667 mg  1 Tab Oral TID WITH MEALS    0.9% sodium chloride infusion 250 mL  250 mL IntraVENous PRN    atenolol (TENORMIN) tablet 25 mg  25 mg Oral 7am    influenza vaccine 2017-18 (3 yrs+)(PF) (FLUZONE QUAD/FLUARIX QUAD) injection 0.5 mL  0.5 mL IntraMUSCular PRIOR TO DISCHARGE    epoetin catrachita (EPOGEN;PROCRIT) injection 10,000 Units  10,000 Units SubCUTAneous Q TUE, THU & SAT     ______________________________________________________________________  EXPECTED LENGTH OF STAY: 2d 7h  ACTUAL LENGTH OF STAY:          2                 Bebeto Dickerson MD

## 2018-04-19 NOTE — PROGRESS NOTES
Name: Carrington Long   MRN: [id number]  : 1935      Assessment:  JONNIE- likely pre-renal from diarrhea and obstruction  B/l hydronephrosis- likely from bladder outlet obstruction  Hyperkalemia-resolved  Acidosis-improved/resolved  Hx of Prostate and mucoepidermoid cancer- now with bone mets noted on CT scan   HTN  Hx of stroke     Good UOP. JONNIE improving    Plan/Recommendations:  D/C HCO3 drip  Start IV NS at 75 ml/hr  Check phos in am- if improving with resolving JONNIE- will d/c phoslo  Leave warren in  Palliative team consulted    Subjective:  Resting.  No acute c/o    ROS:   No nausea, no vomiting  No chest pain, no shortness of breath    Exam:  Visit Vitals    /66    Pulse 69    Temp 98.3 °F (36.8 °C)    Resp 14    Ht 5' 5\" (1.651 m)    Wt 62.4 kg (137 lb 9.1 oz)    SpO2 98%    BMI 22.89 kg/m2       Ill appearing in NAD  Clear ant/lat  RRR, no rub  Warren+  No edema    Current Facility-Administered Medications   Medication Dose Route Frequency Last Dose    pantoprazole (PROTONIX) tablet 40 mg  40 mg Oral ACB&D 40 mg at 18 0658    0.9% sodium chloride infusion 250 mL  250 mL IntraVENous PRN      dronabinol (MARINOL) capsule 5 mg  5 mg Oral BID Stopped at 18 0900    HYDROcodone-acetaminophen (NORCO) 5-325 mg per tablet 1 Tab  1 Tab Oral Q4H PRN      sodium chloride (NS) flush 5-10 mL  5-10 mL IntraVENous Q8H 10 mL at 18 0505    sodium chloride (NS) flush 5-10 mL  5-10 mL IntraVENous PRN      ondansetron (ZOFRAN) injection 4 mg  4 mg IntraVENous Q4H PRN      calcium acetate (PHOSLO) tablet 667 mg  1 Tab Oral TID WITH MEALS Stopped at 18 0800    0.9% sodium chloride infusion 250 mL  250 mL IntraVENous PRN      atenolol (TENORMIN) tablet 25 mg  25 mg Oral 7am 25 mg at 18 0658    sodium bicarbonate (8.4%) 75 mEq in 0.45% sodium chloride 1,000 mL infusion   IntraVENous CONTINUOUS      influenza vaccine 2017-18 (3 yrs+)(PF) (FLUZONE QUAD/FLUARIX QUAD) injection 0.5 mL  0.5 mL IntraMUSCular PRIOR TO DISCHARGE      epoetin catrachita (EPOGEN;PROCRIT) injection 10,000 Units  10,000 Units SubCUTAneous Q TUE, THU & SAT 10,000 Units at 04/17/18 2100       Labs/Data:    Lab Results   Component Value Date/Time    WBC 2.2 (L) 04/19/2018 03:33 AM    Hemoglobin (POC) 13.3 07/29/2011 11:54 AM    HGB 9.4 (L) 04/19/2018 03:33 AM    Hematocrit (POC) 18 (L) 04/17/2018 01:09 AM    HCT 28.7 (L) 04/19/2018 03:33 AM    PLATELET 82 (L) 57/02/4826 03:33 AM    MCV 96.0 04/19/2018 03:33 AM       Lab Results   Component Value Date/Time    Sodium 141 04/19/2018 03:33 AM    Potassium 4.4 04/19/2018 03:33 AM    Chloride 107 04/19/2018 03:33 AM    CO2 22 04/19/2018 03:33 AM    Anion gap 12 04/19/2018 03:33 AM    Glucose 75 04/19/2018 03:33 AM    BUN 72 (H) 04/19/2018 03:33 AM    Creatinine 7.90 (H) 04/19/2018 03:33 AM    BUN/Creatinine ratio 9 (L) 04/19/2018 03:33 AM    GFR est AA 8 (L) 04/19/2018 03:33 AM    GFR est non-AA 7 (L) 04/19/2018 03:33 AM    Calcium 8.4 (L) 04/19/2018 03:33 AM       Wt Readings from Last 3 Encounters:   04/19/18 62.4 kg (137 lb 9.1 oz)   02/08/18 61.7 kg (136 lb 0.4 oz)   09/12/17 68 kg (150 lb)         Intake/Output Summary (Last 24 hours) at 04/19/18 1116  Last data filed at 04/19/18 1041   Gross per 24 hour   Intake          1808.33 ml   Output             1375 ml   Net           433.33 ml       Patient seen and examined. Chart reviewed. Labs, data and other pertinent notes reviewed in last 24 hrs.     PMH/SH/FH reviewed and unchanged compared to H&P    Discussed with pt       Tonio Alexandre MD

## 2018-04-19 NOTE — ACP (ADVANCE CARE PLANNING)
GOALS OF CARE:  Patient/Health Care Proxy Stated Goals: Comfort    TREATMENT PREFERENCES:   Code Status: DNR-DDNR signed. Pt is fully alert, oriented. Children incl dtjohnathna Robledo and son Sujata Pro. He wishes for hospice at home. DDNR signed.        Advance Care Planning:  Advance Care Planning 4/19/2018   Patient's Healthcare Decision Maker is: Legal Next of Kin   Primary Decision Maker Name Venkat Hope-Daughter   Primary Decision Maker Phone Number [phone]   Primary Decision Maker Relationship to Patient Adult child   Secondary Decision Maker Name -   Secondary Decision [address] [place] [address] Ave Phone Number -   Secondary Decision Maker Relationship to Patient -   Confirm Advance Directive None   Patient Would Like to Complete Advance Directive -   Does the patient have other document types -       Medical Interventions: Limited additional interventions (DNR/I)

## 2018-04-19 NOTE — PROGRESS NOTES
Spiritual Care Assessment/Progress Note  [facility]      NAME: Giuliana Jacobo      MRN: [id number]  AGE: 80 y.o. SEX: male  Jew Affiliation: Mosque   Language: English     4/19/2018     Total Time (in minutes): 6     Spiritual Assessment begun in [facility] through conversation with:         []Patient        [] Family    [] Friend(s)        Reason for Consult: Palliative Care, Initial/Spiritual Assessment     Spiritual beliefs: (Please include comment if needed)     [] Involved in a afua tradition/spiritual practice:     [] Supported by a afua community:      [] Claims no spiritual orientation:      [] Seeking spiritual identity:           [] Adheres to an individual form of spirituality:      [x] Not able to assess:  Pt declined  visit                  Identified resources for coping:      [] Prayer                  [] Devotional reading               [] Music                  [] Guided Imagery     [] Family/friends                 [] Pet visits     [] Other       Interventions offered during this visit: (See comments for more details)    Patient Interventions: Initial/Spiritual assessment, patient floor     Family/Friend(s): Prayer (assurance of)     Plan of Care:     [] Discuss Spiritual/Cultural needs    [] Support AMD and/or advance care planning process      [] Support grieving process   [] Coordinate Rites/Rituals    [] Coordination with community clergy   [x] No spiritual needs identified at this time   [] Detailed Plan of Care below (See Comments)  [] Make referral to Music Therapy  [] Make referral to Pet Therapy     [] Make referral to Addiction services  [] Make referral to [facility]  [] Make referral to Spiritual Care Partner  [] No future visits requested             Comments: Visited with Ms. Casey Beasley for initial spiritual assessment. Met pt's son who was present at bedside. He directed  to speak directly into pt's right ear.   Introduced myself to pt who declined  visit. Assured him and his son of  availability if desired at a later time.     Ela Monahan, Palliative

## 2018-04-19 NOTE — PROGRESS NOTES
Bedside shift change report given to Jennifer Holman RN (oncoming nurse) by Min Schulte RN (offgoing nurse). Report included the following information SBAR, Kardex, Accordion, Recent Results and Cardiac Rhythm NSR.

## 2018-04-19 NOTE — CONSULTS
Palliative Medicine Consult  Chong: [phone] ([phone])    Patient Name: Blas Squires  YOB: 1935    Date of Initial Consult: 18  Reason for Consult: Care decisions   Requesting Provider: Dr Chu Cho  Primary Care Physician: Malika Alberto MD     SUMMARY:   Blas Squires is a 80 y.o. 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 With JONNIE compared to 2018 and b/l hydronephrosis likely from bladder outlet obstruction, warren placed and urology to see as outpatient. CT scans showing diffuse bone mets. Current medical issues leading to Palliative Medicine involvement include: care decisions. Children Venkat and Otis not inclined for dialysis. Social: Pt is one of 11 children incl his twin sister. His wife  and had hospice in past.   PALLIATIVE DIAGNOSES:   1. Fatigue  2. Debility  3. Urinary retention  4. JONNIE  5. Goals of care       PLAN:   1. First I speak w/ dtr Venkat on phone and then meet w/ pt and son Beata Morrison. 2. Pt is hard of hearing but alert/oriented. I speak loudly near his R ear. 3. Pt knows that \"my kidneys are failing\" and that he has 2 types of cancer. Stopped seeing Dr Jhon Lane 2017 and does not want any other tx. He is also clear he does not want dialysis. This has been consistent and family in agreement. Of note, son is a dialysis tech and does not think that dialysis will improve pt's quality of life. 4. Discuss f/u with Dr Jhon Lane to see if any other measures besides warren cath- however pt would not want any procedures done and says the warren does not bother him. He can have smaller bag and could still walk around, changing actual warren ~ once a month. One of his brothers has had a warren for several years. Aware that it can incr chance of UTIs.  He does not want urology f/u.   5. Because pt clear w/ his goals, I think hospice would serve him best. Has medicaid aids who come in daily, but is alone at night. 2 children- dtr lives in [place], son in [place] and they might be able to spend some nights w/ him. 6. Explain hospice philosophy and he is in agreement. However concerned that they don't come out every day. I think can help manage sx proactively. Since has medicaid and medicare, still can have caregivers. 7. Talk about code status. Pt and family surprised when I tell them he is listed as a full code. Pt very clear that he does not want resuscitation. DDNR signed. 8. Plan: when stable for home, pt wishes for hospice (through Medicare) and to cont to have his home aids (through [facility]). DDNR signed. 9. Initial consult note routed to primary continuity provider  10. Communicated plan of care with: Palliative IDT; Lisbet Ferraro care management; Dr Jared Maynard / TREATMENT PREFERENCES:     GOALS OF CARE:  Patient/Health Care Proxy Stated Goals: Comfort      TREATMENT PREFERENCES:   Code Status: DNR    Advance Care Planning:  Advance Care Planning 4/19/2018   Patient's Healthcare Decision Maker is: Legal Next of Kin   Primary Decision Maker Name Venkat Patel-Daughter   Primary Decision Maker Phone Number [phone]   Primary Decision Maker Relationship to Patient Adult child   Secondary Decision Maker Name -   Secondary Decision [address] Phone Number -   Secondary Decision Maker Relationship to Patient -   Confirm Advance Directive None   Patient Would Like to Complete Advance Directive -   Does the patient have other document types -       Medical Interventions: Limited additional interventions (DNR/I)   Other Instructions: Other:    As far as possible, the palliative care team has discussed with patient / health care proxy about goals of care / treatment preferences for patient.            HISTORY:     History obtained from: Pt, chart, family, staff    CHIEF COMPLAINT: How long do you think I have to live    HPI/SUBJECTIVE:    The patient is:   [x] Verbal and participatory  [] Non-participatory due to:     Pt in NAD, denies pain or SOB. Is okay w/ warren in place. Very realistic about his medical issues. Clinical Pain Assessment (nonverbal scale for severity on nonverbal patients):   Clinical Pain Assessment  Severity: 0          Duration: for how long has pt been experiencing pain (e.g., 2 days, 1 month, years)  Frequency: how often pain is an issue (e.g., several times per day, once every few days, constant)     FUNCTIONAL ASSESSMENT:     Palliative Performance Scale (PPS):  PPS: 50       PSYCHOSOCIAL/SPIRITUAL SCREENING:     Palliative IDT has assessed this patient for cultural preferences / practices and a referral made as appropriate to needs (Cultural Services, Patient Advocacy, Ethics, etc.)    Advance Care Planning:  Advance Care Planning 4/19/2018   Patient's Healthcare Decision Maker is: Legal Next of Kin   Primary Decision Maker Name Venkat Patel-Daughter   Primary Decision Maker Phone Number [phone]   Primary Decision Maker Relationship to Patient Adult child   Secondary Decision Maker Name -   Secondary Decision [facility] Phone Number -   Secondary Decision Maker Relationship to Patient -   Confirm Advance Directive None   Patient Would Like to Complete Advance Directive -   Does the patient have other document types -       Any spiritual / Hinduism concerns:  [] Yes /  [x] No    Caregiver Burnout:  [] Yes /  [x] No /  [] No Caregiver Present      Anticipatory grief assessment:   [x] Normal  / [] Maladaptive       ESAS Anxiety: Anxiety: 0    ESAS Depression: Depression: 0        REVIEW OF SYSTEMS:     Positive and pertinent negative findings in ROS are noted above in HPI. The following systems were [x] reviewed / [] unable to be reviewed as noted in HPI  Other findings are noted below.   Systems: constitutional, ears/nose/mouth/throat, respiratory, gastrointestinal, genitourinary, musculoskeletal, integumentary, neurologic, psychiatric, endocrine. Positive findings noted below. Modified ESAS Completed by: provider   Fatigue: 4 Drowsiness: 0   Depression: 0 Pain: 0   Anxiety: 0 Nausea: 0   Anorexia: 0 Dyspnea: 0           Stool Occurrence(s): 5        PHYSICAL EXAM:     From RN flowsheet:  Wt Readings from Last 3 Encounters:   04/19/18 137 lb 9.1 oz (62.4 kg)   02/08/18 136 lb 0.4 oz (61.7 kg)   09/12/17 150 lb (68 kg)     Blood pressure 161/66, pulse 69, temperature 98.3 °F (36.8 °C), resp. rate 14, height 5' 5\" (1.651 m), weight 137 lb 9.1 oz (62.4 kg), SpO2 98 %.     Pain Scale 1: Numeric (0 - 10)  Pain Intensity 1: 0                     Constitutional: awake, alert, oriented fully, hard of hearing   Eyes: pupils equal, anicteric  ENMT: no nasal discharge, moist mucous membranes  Respiratory: breathing not labored  Musculoskeletal: no deformity, no tenderness to palpation  Skin: warm, dry  Neurologic: following commands, moving all extremities  Psychiatric: full affect, no hallucinations       HISTORY:     Principal Problem:    Acute renal failure (ARF) ([address] (4/17/2018)      Past Medical History:   Diagnosis Date    Arthritis     Cancer ([address] 2004    malignant neoplasm of salivary glands    GERD (gastroesophageal reflux disease)     Hearing loss     bilateral    Heart failure ([address]     HAS PACER    Hypercholesteremia     Hypertension     medicated    Other ill-defined conditions(799.89)     facial nerve disorder    Other ill-defined conditions(799.89)     enlarged prostate    Other ill-defined conditions(799.89)     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   Social History   Substance Use Topics    Smoking status: Former Smoker     Packs/day: 1.00     Years: 20.00     Quit date: 5/18/1980    Smokeless tobacco: Never Used    Alcohol use No     No Known Allergies   Current Facility-Administered Medications   Medication Dose Route Frequency    0.9% sodium chloride infusion  75 mL/hr IntraVENous CONTINUOUS    pantoprazole (PROTONIX) tablet 40 mg  40 mg Oral ACB&D    0.9% sodium chloride infusion 250 mL  250 mL IntraVENous PRN    dronabinol (MARINOL) capsule 5 mg  5 mg Oral BID    HYDROcodone-acetaminophen (NORCO) 5-325 mg per tablet 1 Tab  1 Tab Oral Q4H PRN    sodium chloride (NS) flush 5-10 mL  5-10 mL IntraVENous Q8H    sodium chloride (NS) flush 5-10 mL  5-10 mL IntraVENous PRN    ondansetron (ZOFRAN) injection 4 mg  4 mg IntraVENous Q4H PRN    calcium acetate (PHOSLO) tablet 667 mg  1 Tab Oral TID WITH MEALS    0.9% sodium chloride infusion 250 mL  250 mL IntraVENous PRN    atenolol (TENORMIN) tablet 25 mg  25 mg Oral 7am    influenza vaccine 2017-18 (3 yrs+)(PF) (FLUZONE QUAD/FLUARIX QUAD) injection 0.5 mL  0.5 mL IntraMUSCular PRIOR TO DISCHARGE    epoetin catrachita (EPOGEN;PROCRIT) injection 10,000 Units  10,000 Units SubCUTAneous Q TUE, THU & SAT          LAB AND IMAGING FINDINGS:     Lab Results   Component Value Date/Time    WBC 2.2 (L) 04/19/2018 03:33 AM    HGB 9.4 (L) 04/19/2018 03:33 AM    PLATELET 82 (L) 01/80/9334 03:33 AM     Lab Results   Component Value Date/Time    Sodium 141 04/19/2018 03:33 AM    Potassium 4.4 04/19/2018 03:33 AM    Chloride 107 04/19/2018 03:33 AM    CO2 22 04/19/2018 03:33 AM    BUN 72 (H) 04/19/2018 03:33 AM    Creatinine 7.90 (H) 04/19/2018 03:33 AM    Calcium 8.4 (L) 04/19/2018 03:33 AM    Magnesium 2.0 04/16/2018 11:41 PM    Phosphorus 4.9 (H) 04/18/2018 04:13 AM Lab Results   Component Value Date/Time    AST (SGOT) 15 04/17/2018 06:58 AM    Alk. phosphatase 358 (H) 04/17/2018 06:58 AM    Protein, total 4.7 (L) 04/17/2018 01:07 PM    Albumin 2.2 (L) 04/17/2018 06:58 AM    Globulin 3.3 04/17/2018 06:58 AM     Lab Results   Component Value Date/Time    INR 1.1 04/16/2018 11:41 PM    Prothrombin time 11.5 (H) 04/16/2018 11:41 PM    aPTT 23.1 04/16/2018 11:41 PM      Lab Results   Component Value Date/Time    Iron 87 04/17/2018 01:07 PM    TIBC 175 (L) 04/17/2018 01:07 PM    Iron % saturation 50 04/17/2018 01:07 PM    Ferritin 1044 (H) 04/17/2018 01:07 PM      No results found for: PH, PCO2, PO2  No components found for: [place]   Lab Results   Component Value Date/Time    CK 51 04/17/2018 06:58 AM    CK - MB 1.8 04/17/2018 06:58 AM                Total time: 70 min   Counseling / coordination time, spent as noted above: 50 min   > 50% counseling / coordination?: yes    Prolonged service was provided for  []30 min   []75 min in face to face time in the presence of the patient, spent as noted above. Time Start:   Time End:   Note: this can only be billed with 21620 (initial) or 44966 (follow up). If multiple start / stop times, list each separately.

## 2018-04-19 NOTE — HOSPICE
[facility]  [phone]     Patient Name: Aisha Jack  YOB: 1935  Age: 80 y.o. [facility] RN Note:  Hospice consult received, reviewing chart. Will follow up with Unit Nurse and Care Manager to discuss plan of care, patient status and discharge disposition within the hour. I spoke with Sara cuellar regarding patient's zip code, this is just beyond our radius and we are unable to accept patient for home admission. I notified Shamir Cole and she will reach out to another hospice company. Thank you for the opportunity to be of service to this patient.       Alina Vasquez RN  [facility]

## 2018-04-19 NOTE — PROGRESS NOTES
CM received consult for hospice. During the initial assessment with pt and his daughter, Gerri Ferguson, on yesterday afternoon, they wanted to have a Palliative Care consult prior to considering hospice. This CM informed the Hospitalist, Dr Page Schmid of this information this am. He will place the order for Palliative consult. CM will wait until pt and family talk to Palliative before proceeding to send referral to hospice as pt's daughter expressed. Dr Page Schmid is aware. Fela Nicholson, RN AC CRM    Received consult from Dr Arpita Rios in Palliative care for home hospice. CM talked to pt and his son in room to offer freedom of choice. They will accept hospice that serve the pt's home area. CM sent several hospice agencies a referral that are included under pt's zip code range. Referral sent to [facility] as well.  Hospice authorized referral in [facility]. CM talked to pt and his son to notify. They are in agreement for [facility]. CM received call from Huong Epstein, liaison for [facility], [phone], stating that they will accept pt should the family decide not to use [id number].

## 2018-04-19 NOTE — PROGRESS NOTES
Problem: Risk for Spread of Infection  Goal: Prevent transmission of infectious organism to others  Prevent the transmission of infectious organisms to other patients, staff members, and visitors. Outcome: Progressing Towards Goal  Patient is afebrile and is showing no signs of infection or distress. Proper hand sanitizing is being performed by all. Will continue to monitor for signs of infection     Problem: Falls - Risk of  Goal: *Absence of Falls  Document Dalia Fall Risk and appropriate interventions in the flowsheet. Outcome: Progressing Towards Goal  Bed is in the lowest position and wheels are locked, call bell is within reach, bathroom light is on during evening hours, gripper socks are on and patient has been instructed to call out for assistance if needed. As of now, patient is free from falls and will continue to be monitored.      Fall Risk Interventions:  Mobility Interventions: Assess mobility with egress test, Communicate number of staff needed for ambulation/transfer, OT consult for ADLs, Patient to call before getting OOB, PT Consult for assist device competence, PT Consult for mobility concerns         Medication Interventions: Assess postural VS orthostatic hypotension, Patient to call before getting OOB, Teach patient to arise slowly    Elimination Interventions: Call light in reach, Patient to call for help with toileting needs

## 2018-04-20 VITALS
RESPIRATION RATE: 18 BRPM | WEIGHT: 137.57 LBS | DIASTOLIC BLOOD PRESSURE: 75 MMHG | BODY MASS INDEX: 22.92 KG/M2 | TEMPERATURE: 98.6 F | HEART RATE: 76 BPM | SYSTOLIC BLOOD PRESSURE: 164 MMHG | OXYGEN SATURATION: 96 % | HEIGHT: 65 IN

## 2018-04-20 LAB — PHOSPHATE SERPL-MCNC: 5.1 MG/DL (ref 2.6–4.7)

## 2018-04-20 PROCEDURE — 74011250637 HC RX REV CODE- 250/637: Performed by: HOSPITALIST

## 2018-04-20 PROCEDURE — 84100 ASSAY OF PHOSPHORUS: CPT | Performed by: INTERNAL MEDICINE

## 2018-04-20 PROCEDURE — 74011250637 HC RX REV CODE- 250/637: Performed by: INTERNAL MEDICINE

## 2018-04-20 PROCEDURE — 74011250636 HC RX REV CODE- 250/636: Performed by: INTERNAL MEDICINE

## 2018-04-20 PROCEDURE — 36415 COLL VENOUS BLD VENIPUNCTURE: CPT | Performed by: INTERNAL MEDICINE

## 2018-04-20 RX ORDER — HYDROCODONE BITARTRATE AND ACETAMINOPHEN 5; 325 MG/1; MG/1
1 TABLET ORAL
Qty: 30 TAB | Refills: 0 | Status: SHIPPED | OUTPATIENT
Start: 2018-04-20

## 2018-04-20 RX ADMIN — SODIUM CHLORIDE 75 ML/HR: 900 INJECTION, SOLUTION INTRAVENOUS at 04:09

## 2018-04-20 RX ADMIN — ATENOLOL 25 MG: 25 TABLET ORAL at 07:26

## 2018-04-20 RX ADMIN — DRONABINOL 5 MG: 2.5 CAPSULE ORAL at 08:54

## 2018-04-20 RX ADMIN — CALCIUM ACETATE 667 MG: 667 TABLET ORAL at 11:51

## 2018-04-20 RX ADMIN — CALCIUM ACETATE 667 MG: 667 TABLET ORAL at 07:25

## 2018-04-20 NOTE — PROGRESS NOTES
Physical Therapy Screening:  Services are not indicated at this time. An InBasket screening referral was triggered for physical therapy based on results obtained during the nursing admission assessment. The patients chart was reviewed and the patient is not appropriate for a skilled therapy evaluation at this time. Please consult physical therapy if any therapy needs arise. Thank you.     Krystin Mustafa, PT

## 2018-04-20 NOTE — PROGRESS NOTES
Bedside shift change report given to [address] (oncoming nurse) by Cecilia Rust (offgoing nurse). Report included the following information SBAR, Kardex, MAR and Recent Results.

## 2018-04-20 NOTE — DISCHARGE SUMMARY
Discharge Summary       PATIENT ID: Blas Squires  MRN: [id number]   YOB: 1935    DATE OF ADMISSION: 4/17/2018 12:39 AM    DATE OF DISCHARGE: 4/20/2018   PRIMARY CARE PROVIDER: Malika Alberto MD     ATTENDING PHYSICIAN: Jerad Serrato  DISCHARGING PROVIDER: Emily Keller MD    To contact this individual call [phone] and ask the  to page. If unavailable ask to be transferred the Adult Hospitalist Department. CONSULTATIONS: IP CONSULT TO GASTROENTEROLOGY  IP CONSULT TO NEPHROLOGY  IP CONSULT TO HEMATOLOGY  IP CONSULT TO PALLIATIVE CARE - PROVIDER    PROCEDURES/SURGERIES: * No surgery found *    ADMITTING DIAGNOSES & HOSPITAL COURSE:     80 y.o. male with past medical history significant for pacemaker, GERD, HTN, Stroke, Cancer, Appendectomy who presents from home accompanied by family with chief complaint of leg swelling. Per family, pt attended an appointment with his primary care today for symptoms of leg swelling and diarrhea over the past couple of days. Consistency and color of stool unknown. While at PCP, pt had blood work performed. Family received a call this evening stating that he should be evaluated in ED for \"being high risk for a blood clot\" due to an elevated D-dimer. While in Ed, pt deneis any pain. He has a pacemaker in place for unexplained syncope. He also is a patient with cancer (prostate and mucoepidermoid). He has had radiation and surgery in the past for his cancer but has declined further treatment. Pt takes Atenolol and Baby aspirin daily. NKDA. Pt denies chest pain, shortness of breath, known fever, abdominal pain, headache, arm pain or leg pain. There are no other acute medical concerns at this time. Hospital Course    Patient was managed for renal failure from volume depletion and post obstructive from prostate cancer. Nephrology follows the patient during this hospitalization and patient managed with IVF.  Upon further discussion, patient and family declined if patient were to need dialysis. Patient also seen by urology and warren cath placed for bladder outlet obstruction. Patient has history of prostate cancer, but stopped follow up, on CT abdomen and pelvis done during this admission, patient noted to have metastatic bone lesions. Upon further discussion with the family, they opted for comfort care. Patient to be discharged to home with home hospice care. DISCHARGE DIAGNOSES / PLAN:      1. Acute renal failure  2. Bladder outlet obstruction  3. Prostate cancer  4. Metastatic bone lesions  5. Hydronephrosis  6. Anemia  7. Hypertension       PENDING TEST RESULTS:   At the time of discharge the following test results are still pending: None    FOLLOW UP APPOINTMENTS:    Follow-up Information     Follow up With Details Comments [address], [place]   [address] In 1 day  [address] [place] [address]             ADDITIONAL CARE RECOMMENDATIONS: None    DIET: Cardiac Diet  Oral Nutritional Supplements: No Oral Supplement prescribed    ACTIVITY: Activity as tolerated    WOUND CARE: None    EQUIPMENT needed: None      DISCHARGE MEDICATIONS:  Current Discharge Medication List      CONTINUE these medications which have CHANGED    Details   HYDROcodone-acetaminophen (NORCO) 5-325 mg per tablet Take 1 Tab by mouth every six (6) hours as needed. Max Daily Amount: 4 Tabs. Qty: 30 Tab, Refills: 0    Associated Diagnoses: Bony metastasis ([address]         CONTINUE these medications which have NOT CHANGED    Details   atenolol (TENORMIN) 25 mg tablet Take 25 mg by mouth daily. levETIRAcetam (KEPPRA) 500 mg tablet Take 1 Tab by mouth two (2) times a day.   Qty: 60 Tab, Refills: 0         STOP taking these medications       aspirin delayed-release 81 mg tablet Comments:   Reason for Stopping:                 NOTIFY YOUR PHYSICIAN FOR ANY OF THE FOLLOWING:   Fever over 101 degrees for 24 hours. Chest pain, shortness of breath, fever, chills, nausea, vomiting, diarrhea, change in mentation, falling, weakness, bleeding. Severe pain or pain not relieved by medications. Or, any other signs or symptoms that you may have questions about. DISPOSITION:    Home With:   OT  PT  HH  RN       Long term SNF/Inpatient Rehab    Independent/assisted living    Hospice    Other:       PATIENT CONDITION AT DISCHARGE:     Functional status    Poor     Deconditioned     Independent      Cognition     Lucid     Forgetful     Dementia      Catheters/lines (plus indication)    [place]     PICC     PEG     None      Code status     Full code     DNR      PHYSICAL EXAMINATION AT DISCHARGE:    The physical exam is generally normal. He appears well, alert and oriented x 3, pleasant and cooperative. Vitals as noted. ENT normal, neck supple and free of adenopathy, or masses. No thyromegaly or carotid bruits. Cranial nerves and fundi normal. Lungs are clear to auscultation. Heart sounds are normal, no murmurs, clicks, gallops or rubs. Abdomen is soft, no tenderness, masses or organomegaly. Extremities, peripheral pulses and reflexes are normal. Screening neurological exam is normal without focal findings. Skin is normal without suspicious lesions.     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

## 2018-04-20 NOTE — PROGRESS NOTES
Problem: Falls - Risk of  Goal: *Absence of Falls  Document Dalia Fall Risk and appropriate interventions in the flowsheet.    Outcome: Progressing Towards Goal  Fall Risk Interventions:  Mobility Interventions: Communicate number of staff needed for ambulation/transfer         Medication Interventions: Evaluate medications/consider consulting pharmacy    Elimination Interventions: Patient to call for help with toileting needs

## 2018-04-20 NOTE — PROGRESS NOTES
Problem: Falls - Risk of  Goal: *Absence of Falls  Document Dalia Fall Risk and appropriate interventions in the flowsheet.    Outcome: Progressing Towards Goal  Fall Risk Interventions:  Mobility Interventions: Assess mobility with egress test, Patient to call before getting OOB         Medication Interventions: Assess postural VS orthostatic hypotension, Patient to call before getting OOB    Elimination Interventions: Call light in reach, Patient to call for help with toileting needs

## 2018-04-20 NOTE — PROGRESS NOTES
Iv removed, changed to leg bag on warren, discharge instructions and prescription given to son, clarified Keppra medication with Dr. Chu Cho. Patient being discharged and will be admitted to home hospice care with [facility] tomorrow morning. No questions at this time. Patient discharged with warren in place.

## 2018-04-20 NOTE — PROGRESS NOTES
CM reviewed chart and spoke with [facility], who confirmed that they met with pt and son yesterday and plan is for pt to go home today and [facility] is planning to admit early Saturday Morning (9:30am) CM spoke with family to confirm plan. They are taking pt home to his home, and family plans on staying with him. Pt's son plans to transport pt home. CM faxed Discharge summary to [facility].   OLIVER Regaldao, ACM

## 2018-04-20 NOTE — ROUTINE PROCESS
Bedside and Verbal shift change report given to Perez Shabazz RN (oncoming nurse) by Corbin Dyson RN (offgoing nurse). Report included the following information SBAR, Intake/Output and Recent Results.

## 2018-04-20 NOTE — PROGRESS NOTES
TRANSFER - OUT REPORT:    Verbal report given to RAMONITA Martinez(name) on Ki Velazquez  being transferred to (unit) for routine progression of care       Report consisted of patients Situation, Background, Assessment and   Recommendations(SBAR). Information from the following report(s) SBAR, ED Summary, Intake/Output, MAR, Recent Results, Med Rec Status and Cardiac Rhythm NSR was reviewed with the receiving nurse. Lines:   Peripheral IV 04/18/18 Right Wrist (Active)   Site Assessment Clean, dry, & intact 4/19/2018  3:15 PM   Phlebitis Assessment 0 4/19/2018  3:15 PM   Infiltration Assessment 0 4/19/2018  3:15 PM   Dressing Status Clean, dry, & intact 4/19/2018  3:15 PM   Dressing Type Transparent;Tape 4/19/2018  3:15 PM   Hub Color/Line Status End cap changed 4/19/2018  3:15 PM   Action Taken Open ports on tubing capped 4/19/2018  3:15 PM   Alcohol Cap Used Yes 4/19/2018  3:15 PM       Peripheral IV 04/18/18 Left Arm (Active)   Site Assessment Clean, dry, & intact 4/19/2018  3:15 PM   Phlebitis Assessment 0 4/19/2018  3:15 PM   Infiltration Assessment 0 4/19/2018  3:15 PM   Dressing Status Clean, dry, & intact 4/19/2018  3:15 PM   Dressing Type Transparent;Tape 4/19/2018  3:15 PM   Hub Color/Line Status End cap changed 4/19/2018  3:15 PM   Action Taken Open ports on tubing capped 4/19/2018  3:15 PM   Alcohol Cap Used Yes 4/19/2018  3:15 PM        Opportunity for questions and clarification was provided.       Patient transported with:   [employer]

## 2018-04-21 LAB
BACTERIA SPEC CULT: NORMAL
C JEJUNI+C COLI AG STL QL: NEGATIVE
E COLI SXT1+2 STL IA: NEGATIVE
SERVICE CMNT-IMP: NORMAL

## 2018-04-23 LAB
O+P SPEC MICRO: NORMAL
O+P STL CONC: NORMAL
SPECIMEN SOURCE: NORMAL

## 2018-04-24 NOTE — H&P
[facility]  HISTORY AND PHYSICAL      Ashley CARTER  MR#: [id number]  : 1935  ACCOUNT #: [de-identified]   ADMIT DATE: 2018    PRIMARY CARE PHYSICIAN:  Dr. Rosmery Tripathi. SOURCE OF INFORMATION:  The patient. CHIEF COMPLAINT:  Bilateral leg swelling. HISTORY OF PRESENT ILLNESS:  This is an 43-year-old man with a past medical history significant for a malignant neoplasm of parotid gland, malignant brain tumor, mucoepidermoid tumor, prostate cancer, hypertension, who was in his usual state of health until a couple of days ago when the patient started experiencing bilateral leg swelling. The swelling is progressive and not associated with pain. Patient has also been having diarrhea over the past couple of days. There was no blood in the stool and no mucoid. The patient went to his primary care physician for evaluation of the leg swelling. Lab work was performed, which included a D-dimer. The patient received a phone call at home advising him to go to the emergency room because of the elevated D-dimer. The patient has had radiation and surgery for prostate cancer and a mucoepidermoid, as well as radiation. He has declined further treatment. The last time the patient was admitted to this hospital was on 2018 to 2018. The patient was admitted with syncope, was subsequently discharged home on [place]. Patient denies associated shortness of breath with his leg swelling, no fever, no rigors, no chills. When the patient arrived at the emergency room, he was found to have abnormal renal function. The patient had normal kidney function in February when he was admitted. Hemoglobin was also lower than normal.  Was also guaiac positive. Patient was subsequently referred to the hospitalist service for evaluation for admission.   A CT scan of the abdomen and pelvis done in the emergency room shows bilateral hydronephrosis, as well as bone metastasis. PAST MEDICAL HISTORY:  Malignant neoplasm of parotid gland, mucoepidermoid tumor, malignant brain tumor, prostate cancer, hypertension. ALLERGIES:  NO KNOWN DRUG ALLERGIES. MEDICATIONS:  Aspirin 81 mg daily, atenolol 50 mg daily in the morning, Marinol 5 mg twice daily, Norco 5-325 one tablet every 4 hour as needed, Keppra 500 mg twice daily. FAMILY HISTORY:  This was reviewed. His mother had cancer, the type of cancer is not known. PAST SURGICAL HISTORY:  This is significant for appendectomy, paraaortic gland surgery, right shoulder surgery, pacemaker insertion, prostatectomy. SOCIAL HISTORY:  Patient is a former smoker and quit tobacco abuse in 05/1980, no history of alcohol abuse. REVIEW OF SYSTEMS  HEAD, EYES, EARS, NOSE, THROAT:  No headache, no dizziness, no blurring of vision, no photophobia. RESPIRATORY:  No cough, no shortness of breath, no hemoptysis. CARDIOVASCULAR:  No chest pain, no orthopnea, no palpitation. GASTROINTESTINAL:  No nausea or vomiting, no diarrhea, no constipation. GENITOURINARY:  No dysuria, no urgency and no frequency. All other systems are reviewed and they are negative. PHYSICAL EXAMINATION  GENERAL:  Patient appeared ill, in moderate distress. VITAL SIGNS:  On arrival at the emergency room, temperature 98.1, pulse 69, respiratory rate 16, blood pressure 148/59, oxygen saturation 99% on room air. HEENT:  Head is normocephalic, atraumatic. Eyes:  Normal right eye movement. Deformity of left eye and left side of the face noted. Ears:  Normal external ears with no obvious drainage. Nose:  No deformity, no drainage. Mouth and throat:  No visible oral lesions. NECK:  Supple, no JVD, no thyromegaly. CHEST:  Clear breath sounds. No wheezing, no crackles. HEART:  Normal S1 and S2, regular. No clinically appreciable murmur. ABDOMEN:  Soft, nontender, normal bowel sounds. CENTRAL NERVOUS SYSTEM:  Alert, oriented x3.   Hearing impairment noted. EXTREMITIES:  Edema 2+, pulses 2+ bilaterally. MUSCULOSKELETAL:  Deformity of the left shoulder noted. SKIN:  Deformity of left side of the face noted. PSYCHIATRY:  Normal mood and affect. LYMPHATIC:  No cervical lymphadenopathy. DIAGNOSTIC DATA:  EKG shows sinus rhythm, no ST-T wave abnormalities. Ultrasound of the lower extremity negative for DVT. Chest x-ray:  No acute pathology. CT scan of the abdomen and pelvis without contrast shows cardiomegaly with bilateral effusions, bilateral hydronephrosis, and diffuse bony metastasis. LABORATORY DATA:  Urinalysis is significant for negative nitrite, negative leuk esterase, negative bacteria. Stool occult blood positive. Amylase 45, lipase 181, magnesium 2.0, phosphorus 5.0. Coagulation profile:  PTT 23.1, INR 1.1, PT 11.5. D-dimer 4.83. Hematology:  WBC 3.1, hemoglobin 6.5, hematocrit 21.1, platelet 805. Chemistry:  Sodium 137, potassium 6.8, chloride 106, CO2 20, glucose 94, BUN 74, creatinine 8.51, calcium 8.6, bilirubin total 0.4, ALT 8, AST 16, alkaline phosphatase 381, total protein 5.8, albumin level 2.4, globulin 3.4. Cardiac profile:  Troponin less than 0.04.    ASSESSMENT   1. Acute renal failure. 2.  Suspected upper gastrointestinal bleed. 3.  Acute blood loss anemia. 4.  Leg swelling. 5.  Hyperphosphatemia. 6.  Elevated D-dimer. 7.  Pancytopenia. 8.  Hypertension. 9.  Hyperkalemia. 10.  Bilateral hydronephrosis. 11.  Bone metastasis. PLAN  1. Acute renal failure. We will admit the patient for further evaluation and treatment. This may be due to volume depletion. Of course could be due to mechanical obstruction given the fact that the patient's CT scan is showing bilateral hydronephrosis. We will insert Villeda catheter, will carry out hydration with normal saline, we will obtain a renal ultrasound.   Nephrology consult will be requested to assist in the evaluation and treatment of acute renal failure. 2.  Suspected upper gastrointestinal bleed. Will start the patient on Protonix. Gastroenterology consult will be requested to assist in evaluation and treatment. 3.  Acute blood loss anemia. We will transfuse the patient with 2 units of packed red blood cells. We will check the patient's hemoglobin and hematocrit serially. 4.  Leg swelling. Ultrasound of the lower extremities is negative. The leg swelling is most likely as a result of the generalized body swelling from the acute renal failure. We will continue to monitor. 5.  Hyperphosphatemia. This is due to renal failure. We will start the patient on PhosLo. We will await further recommendation from the nephrologist.  6.  Elevated D-dimer. The ultrasound of the lower extremities is negative for DVT. We will request a VQ scan to evaluate the patient for pulmonary embolism. 7.  Pancytopenia. This may be related to the patient's cancer. Hematology consult will be requested to assist in further evaluation and treatment. 8.  Hypertension. Will resume preadmission medication. 9.  Hyperkalemia. This was treated in the emergency room. We will repeat potassium level. 10.  Bilateral hydronephrosis. This may be the cause of the acute renal failure. We will obtain a renal ultrasound as stated above. Urology consult will be requested to assist in evaluation and treatment. 11.  Bone metastases. This is most likely from the patient's multiple cancer. We will carry out conservative therapy while awaiting further recommendation from the oncologist.      OTHER ISSUES  1.  CODE STATUS:  The patient is FULL CODE. 2.  We will request SCDs for DVT prophylaxis.       MD KAROLYN Hebert/RYANN  D: 04/17/2018 05:33     T: 04/17/2018 06:10  JOB #: [id number]  CC: Isabela Brown MD